# Patient Record
Sex: MALE | Race: BLACK OR AFRICAN AMERICAN | NOT HISPANIC OR LATINO | Employment: OTHER | URBAN - METROPOLITAN AREA
[De-identification: names, ages, dates, MRNs, and addresses within clinical notes are randomized per-mention and may not be internally consistent; named-entity substitution may affect disease eponyms.]

---

## 2017-01-05 ENCOUNTER — HISTORICAL (OUTPATIENT)
Dept: INFUSION THERAPY | Facility: HOSPITAL | Age: 67
End: 2017-01-05

## 2017-04-15 ENCOUNTER — HOSPITAL ENCOUNTER (OUTPATIENT)
Dept: ADMINISTRATIVE | Facility: HOSPITAL | Age: 67
End: 2017-04-16
Attending: INTERNAL MEDICINE | Admitting: INTERNAL MEDICINE

## 2017-05-09 ENCOUNTER — HOSPITAL ENCOUNTER (OUTPATIENT)
Dept: ADMINISTRATIVE | Facility: HOSPITAL | Age: 67
End: 2017-05-10
Attending: INTERNAL MEDICINE | Admitting: INTERNAL MEDICINE

## 2017-05-10 LAB
ABS NEUT (OLG): 3.55 X10(3)/MCL (ref 2.1–9.2)
BUN SERPL-MCNC: 10 MG/DL (ref 7–18)
BUN SERPL-MCNC: 8 MG/DL (ref 7–18)
CALCIUM SERPL-MCNC: 8.2 MG/DL (ref 8.5–10.1)
CALCIUM SERPL-MCNC: 8.6 MG/DL (ref 8.5–10.1)
CHLORIDE SERPL-SCNC: 107 MMOL/L (ref 98–107)
CHLORIDE SERPL-SCNC: 109 MMOL/L (ref 98–107)
CHOLEST SERPL-MCNC: 112 MG/DL (ref 0–200)
CHOLEST/HDLC SERPL: 2.7 {RATIO} (ref 0–5)
CO2 SERPL-SCNC: 24 MMOL/L (ref 21–32)
CO2 SERPL-SCNC: 25 MMOL/L (ref 21–32)
CREAT SERPL-MCNC: 0.87 MG/DL (ref 0.7–1.3)
CREAT SERPL-MCNC: 0.92 MG/DL (ref 0.7–1.3)
ERYTHROCYTE [DISTWIDTH] IN BLOOD BY AUTOMATED COUNT: 18 % (ref 11.5–17)
EST. AVERAGE GLUCOSE BLD GHB EST-MCNC: 94 MG/DL
GLUCOSE SERPL-MCNC: 160 MG/DL (ref 74–106)
GLUCOSE SERPL-MCNC: 171 MG/DL (ref 74–106)
HBA1C MFR BLD: 4.9 % (ref 4.2–6.3)
HCT VFR BLD AUTO: 29.3 % (ref 42–52)
HDLC SERPL-MCNC: 42 MG/DL (ref 35–60)
HGB BLD-MCNC: 8.8 GM/DL (ref 14–18)
LDLC SERPL CALC-MCNC: 59 MG/DL (ref 0–129)
LYMPHOCYTES # BLD AUTO: 0.4 X10(3)/MCL (ref 0.6–4.6)
LYMPHOCYTES NFR BLD AUTO: 10 %
MAGNESIUM SERPL-MCNC: 1.1 MG/DL (ref 1.8–2.4)
MAGNESIUM SERPL-MCNC: 2.4 MG/DL (ref 1.8–2.4)
MAGNESIUM SERPL-MCNC: 2.4 MG/DL (ref 1.8–2.4)
MCH RBC QN AUTO: 21.6 PG (ref 27–31)
MCHC RBC AUTO-ENTMCNC: 30 GM/DL (ref 33–36)
MCV RBC AUTO: 72 FL (ref 80–94)
MONOCYTES # BLD AUTO: 0 X10(3)/MCL (ref 0.1–1.3)
MONOCYTES NFR BLD AUTO: 1 %
NEUTROPHILS # BLD AUTO: 3.55 X10(3)/MCL (ref 2.1–9.2)
NEUTROPHILS NFR BLD AUTO: 89 %
PLATELET # BLD AUTO: 351 X10(3)/MCL (ref 130–400)
PMV BLD AUTO: 10.4 FL (ref 9.4–12.4)
POTASSIUM SERPL-SCNC: 3.7 MMOL/L (ref 3.5–5.1)
POTASSIUM SERPL-SCNC: 3.9 MMOL/L (ref 3.5–5.1)
PSA SERPL-MCNC: 1.73 NG/ML (ref 0–4)
RBC # BLD AUTO: 4.07 X10(6)/MCL (ref 4.7–6.1)
SODIUM SERPL-SCNC: 142 MMOL/L (ref 136–145)
SODIUM SERPL-SCNC: 142 MMOL/L (ref 136–145)
TRIGL SERPL-MCNC: 53 MG/DL (ref 30–150)
TSH SERPL-ACNC: 0.1 MIU/ML (ref 0.36–3.74)
VLDLC SERPL CALC-MCNC: 11 MG/DL
WBC # SPEC AUTO: 4 X10(3)/MCL (ref 4.5–11.5)

## 2017-06-13 ENCOUNTER — HISTORICAL (OUTPATIENT)
Dept: HEMATOLOGY/ONCOLOGY | Facility: CLINIC | Age: 67
End: 2017-06-13

## 2017-06-13 LAB
ABS NEUT (OLG): 1.29 X10(3)/MCL (ref 2.1–9.2)
ALBUMIN SERPL-MCNC: 4.1 GM/DL (ref 3.4–5)
ALBUMIN/GLOB SERPL: 2 {RATIO}
ALP SERPL-CCNC: 55 UNIT/L (ref 20–120)
ALT SERPL-CCNC: 16 UNIT/L
AST SERPL-CCNC: 29 UNIT/L
BASOPHILS # BLD AUTO: 0.01 X10(3)/MCL
BASOPHILS NFR BLD AUTO: 0 % (ref 0–1)
BILIRUB SERPL-MCNC: 0.4 MG/DL
BILIRUBIN DIRECT+TOT PNL SERPL-MCNC: <0.1 MG/DL
BILIRUBIN DIRECT+TOT PNL SERPL-MCNC: >0.3 MG/DL
BUN SERPL-MCNC: 9 MG/DL (ref 7–25)
CALCIUM SERPL-MCNC: 9.1 MG/DL (ref 8.4–10.3)
CHLORIDE SERPL-SCNC: 107 MMOL/L (ref 96–110)
CO2 SERPL-SCNC: 26 MMOL/L (ref 24–32)
CREAT SERPL-MCNC: 0.78 MG/DL (ref 0.7–1.4)
EOSINOPHIL # BLD AUTO: 0.5 10*3/UL
EOSINOPHIL NFR BLD AUTO: 14 % (ref 0–5)
ERYTHROCYTE [DISTWIDTH] IN BLOOD BY AUTOMATED COUNT: 25.6 % (ref 11.5–14.5)
GLOBULIN SER-MCNC: 2.5 GM/ML (ref 2.3–3.5)
GLUCOSE SERPL-MCNC: 88 MG/DL (ref 65–99)
HCT VFR BLD AUTO: 33.9 % (ref 40–51)
HGB BLD-MCNC: 10.5 GM/DL (ref 13.5–17.5)
LYMPHOCYTES # BLD AUTO: 1.39 X10(3)/MCL
LYMPHOCYTES NFR BLD AUTO: 40 % (ref 15–40)
MCH RBC QN AUTO: 22.7 PG (ref 26–34)
MCHC RBC AUTO-ENTMCNC: 31 GM/DL (ref 31–37)
MCV RBC AUTO: 73.4 FL (ref 80–100)
MONOCYTES # BLD AUTO: 0.32 X10(3)/MCL
MONOCYTES NFR BLD AUTO: 9 % (ref 4–12)
NEUTROPHILS # BLD AUTO: 1.29 X10(3)/MCL
NEUTROPHILS NFR BLD AUTO: 37 X10(3)/MCL
PLATELET # BLD AUTO: 252 X10(3)/MCL (ref 130–400)
PMV BLD AUTO: 9.8 FL (ref 7.4–10.4)
POTASSIUM SERPL-SCNC: 3.1 MMOL/L (ref 3.6–5.2)
PROT SERPL-MCNC: 6.6 GM/DL (ref 6–8)
RBC # BLD AUTO: 4.62 X10(6)/MCL (ref 4.5–5.9)
SODIUM SERPL-SCNC: 139 MMOL/L (ref 135–146)
WBC # SPEC AUTO: 3.5 X10(3)/MCL (ref 4.5–11)

## 2017-09-13 ENCOUNTER — HISTORICAL (OUTPATIENT)
Dept: INTERNAL MEDICINE | Facility: CLINIC | Age: 67
End: 2017-09-13

## 2017-09-13 LAB
ABS NEUT (OLG): 1.8 X10(3)/MCL (ref 2.1–9.2)
ALBUMIN SERPL-MCNC: 3.7 GM/DL (ref 3.4–5)
ALBUMIN/GLOB SERPL: 1 RATIO (ref 1–2)
ALP SERPL-CCNC: 57 UNIT/L (ref 45–117)
ALT SERPL-CCNC: 19 UNIT/L (ref 12–78)
AST SERPL-CCNC: 25 UNIT/L (ref 15–37)
BASOPHILS # BLD AUTO: 0.02 X10(3)/MCL
BASOPHILS NFR BLD AUTO: 0 % (ref 0–1)
BILIRUB SERPL-MCNC: 0.5 MG/DL (ref 0.2–1)
BILIRUBIN DIRECT+TOT PNL SERPL-MCNC: 0.1 MG/DL
BILIRUBIN DIRECT+TOT PNL SERPL-MCNC: 0.4 MG/DL
BUN SERPL-MCNC: 9 MG/DL (ref 7–18)
CALCIUM SERPL-MCNC: 8.9 MG/DL (ref 8.5–10.1)
CHLORIDE SERPL-SCNC: 109 MMOL/L (ref 98–107)
CO2 SERPL-SCNC: 28 MMOL/L (ref 21–32)
CREAT SERPL-MCNC: 0.9 MG/DL (ref 0.6–1.3)
EOSINOPHIL # BLD AUTO: 0.38 X10(3)/MCL
EOSINOPHIL NFR BLD AUTO: 10 % (ref 0–5)
ERYTHROCYTE [DISTWIDTH] IN BLOOD BY AUTOMATED COUNT: 17.6 % (ref 11.5–14.5)
GLOBULIN SER-MCNC: 3.1 GM/ML (ref 2.3–3.5)
GLUCOSE SERPL-MCNC: 96 MG/DL (ref 74–106)
HCT VFR BLD AUTO: 34.5 % (ref 40–51)
HGB BLD-MCNC: 11.6 GM/DL (ref 13.5–17.5)
LYMPHOCYTES # BLD AUTO: 1.25 X10(3)/MCL
LYMPHOCYTES NFR BLD AUTO: 33 % (ref 15–40)
MCH RBC QN AUTO: 28.7 PG (ref 26–34)
MCHC RBC AUTO-ENTMCNC: 33.6 GM/DL (ref 31–37)
MCV RBC AUTO: 85.4 FL (ref 80–100)
MONOCYTES # BLD AUTO: 0.38 X10(3)/MCL
MONOCYTES NFR BLD AUTO: 10 % (ref 4–12)
NEUTROPHILS # BLD AUTO: 1.8 X10(3)/MCL
NEUTROPHILS NFR BLD AUTO: 47 X10(3)/MCL
PLATELET # BLD AUTO: 327 X10(3)/MCL (ref 130–400)
PMV BLD AUTO: 9.8 FL (ref 7.4–10.4)
POTASSIUM SERPL-SCNC: 3.4 MMOL/L (ref 3.5–5.1)
PROT SERPL-MCNC: 6.8 GM/DL (ref 6.4–8.2)
RBC # BLD AUTO: 4.04 X10(6)/MCL (ref 4.5–5.9)
SODIUM SERPL-SCNC: 145 MMOL/L (ref 136–145)
WBC # SPEC AUTO: 3.8 X10(3)/MCL (ref 4.5–11)

## 2017-09-19 ENCOUNTER — HISTORICAL (OUTPATIENT)
Dept: LAB | Facility: HOSPITAL | Age: 67
End: 2017-09-19

## 2017-09-19 LAB
ABS NEUT (OLG): 3.19 X10(3)/MCL (ref 2.1–9.2)
BUN SERPL-MCNC: 8 MG/DL (ref 7–18)
CALCIUM SERPL-MCNC: 8.9 MG/DL (ref 8.5–10.1)
CHLORIDE SERPL-SCNC: 105 MMOL/L (ref 98–107)
CO2 SERPL-SCNC: 28 MMOL/L (ref 21–32)
CREAT SERPL-MCNC: 0.77 MG/DL (ref 0.7–1.3)
ERYTHROCYTE [DISTWIDTH] IN BLOOD BY AUTOMATED COUNT: 16.8 % (ref 11.5–17)
GLUCOSE SERPL-MCNC: 90 MG/DL (ref 74–106)
HCT VFR BLD AUTO: 37 % (ref 42–52)
HGB BLD-MCNC: 12.5 GM/DL (ref 14–18)
INR PPP: 1
MCH RBC QN AUTO: 28.9 PG (ref 27–31)
MCHC RBC AUTO-ENTMCNC: 33.8 GM/DL (ref 33–36)
MCV RBC AUTO: 85.6 FL (ref 80–94)
PLATELET # BLD AUTO: 287 X10(3)/MCL (ref 130–400)
PMV BLD AUTO: 9.6 FL (ref 7.4–10.4)
POTASSIUM SERPL-SCNC: 3.6 MMOL/L (ref 3.5–5.1)
PROTHROMBIN TIME: 11.2 SECOND(S) (ref 9.8–12)
RBC # BLD AUTO: 4.32 X10(6)/MCL (ref 4.7–6.1)
SODIUM SERPL-SCNC: 140 MMOL/L (ref 136–145)
WBC # SPEC AUTO: 5.1 X10(3)/MCL (ref 4.5–11.5)

## 2017-10-09 ENCOUNTER — HISTORICAL (OUTPATIENT)
Dept: ADMINISTRATIVE | Facility: HOSPITAL | Age: 67
End: 2017-10-09

## 2017-10-09 LAB
ABS NEUT (OLG): 9.52 X10(3)/MCL
ALBUMIN SERPL-MCNC: 3.6 GM/DL (ref 3.4–5)
ALBUMIN/GLOB SERPL: 1 RATIO (ref 1–2)
ALP SERPL-CCNC: 49 UNIT/L (ref 45–117)
ALT SERPL-CCNC: 24 UNIT/L (ref 12–78)
ANISOCYTOSIS BLD QL SMEAR: NORMAL
AST SERPL-CCNC: 13 UNIT/L (ref 15–37)
BASOPHILS # BLD AUTO: 0.01 X10(3)/MCL
BASOPHILS NFR BLD AUTO: 0 % (ref 0–1)
BILIRUB SERPL-MCNC: 0.4 MG/DL (ref 0.2–1)
BILIRUBIN DIRECT+TOT PNL SERPL-MCNC: 0.2 MG/DL
BILIRUBIN DIRECT+TOT PNL SERPL-MCNC: 0.2 MG/DL
BUN SERPL-MCNC: 24 MG/DL (ref 7–18)
CALCIUM SERPL-MCNC: 9.7 MG/DL (ref 8.5–10.1)
CHLORIDE SERPL-SCNC: 104 MMOL/L (ref 98–107)
CO2 SERPL-SCNC: 26 MMOL/L (ref 21–32)
CREAT SERPL-MCNC: 1.4 MG/DL (ref 0.6–1.3)
EOSINOPHIL # BLD AUTO: 0.1 X10(3)/MCL
EOSINOPHIL NFR BLD AUTO: 1 % (ref 0–5)
ERYTHROCYTE [DISTWIDTH] IN BLOOD BY AUTOMATED COUNT: 16.2 % (ref 11.5–14.5)
GLOBULIN SER-MCNC: 3.4 GM/ML (ref 2.3–3.5)
GLUCOSE SERPL-MCNC: 92 MG/DL (ref 74–106)
HCT VFR BLD AUTO: 34.9 % (ref 40–51)
HGB BLD-MCNC: 11.6 GM/DL (ref 13.5–17.5)
IMM GRANULOCYTES # BLD AUTO: 0.08 10*3/UL
IMM GRANULOCYTES NFR BLD AUTO: 1 %
LYMPHOCYTES # BLD AUTO: 2.94 X10(3)/MCL
LYMPHOCYTES NFR BLD AUTO: 21 % (ref 15–40)
MCH RBC QN AUTO: 28.2 PG (ref 26–34)
MCHC RBC AUTO-ENTMCNC: 33.2 GM/DL (ref 31–37)
MCV RBC AUTO: 84.9 FL (ref 80–100)
MONOCYTES # BLD AUTO: 1.41 X10(3)/MCL
MONOCYTES NFR BLD AUTO: 10 % (ref 4–12)
NEUTROPHILS # BLD AUTO: 9.52 X10(3)/MCL
NEUTROPHILS NFR BLD AUTO: 68 %
PLATELET # BLD AUTO: 888 X10(3)/MCL (ref 130–400)
PLATELET # BLD EST: NORMAL 10*3/UL
PMV BLD AUTO: 9.7 FL (ref 7.4–10.4)
POLYCHROMASIA BLD QL SMEAR: NORMAL
POTASSIUM SERPL-SCNC: 3.6 MMOL/L (ref 3.5–5.1)
PROT SERPL-MCNC: 7 GM/DL (ref 6.4–8.2)
RBC # BLD AUTO: 4.11 X10(6)/MCL (ref 4.5–5.9)
RBC MORPH BLD: NORMAL
SODIUM SERPL-SCNC: 139 MMOL/L (ref 136–145)
WBC # SPEC AUTO: 14.1 X10(3)/MCL (ref 4.5–11)

## 2018-01-22 ENCOUNTER — HISTORICAL (OUTPATIENT)
Dept: ADMINISTRATIVE | Facility: HOSPITAL | Age: 68
End: 2018-01-22

## 2018-01-22 LAB
ABS NEUT (OLG): 1.69 X10(3)/MCL (ref 2.1–9.2)
ALBUMIN SERPL-MCNC: 3.9 GM/DL (ref 3.4–5)
ALBUMIN/GLOB SERPL: 1 RATIO (ref 1–2)
ALP SERPL-CCNC: 63 UNIT/L (ref 45–117)
ALT SERPL-CCNC: 19 UNIT/L (ref 12–78)
AST SERPL-CCNC: 29 UNIT/L (ref 15–37)
BASOPHILS # BLD AUTO: 0.02 X10(3)/MCL
BASOPHILS NFR BLD AUTO: 1 % (ref 0–1)
BILIRUB SERPL-MCNC: 0.5 MG/DL (ref 0.2–1)
BILIRUBIN DIRECT+TOT PNL SERPL-MCNC: 0.1 MG/DL
BILIRUBIN DIRECT+TOT PNL SERPL-MCNC: 0.4 MG/DL
BUN SERPL-MCNC: 5 MG/DL (ref 7–18)
CALCIUM SERPL-MCNC: 8.8 MG/DL (ref 8.5–10.1)
CHLORIDE SERPL-SCNC: 110 MMOL/L (ref 98–107)
CO2 SERPL-SCNC: 26 MMOL/L (ref 21–32)
CREAT SERPL-MCNC: 0.8 MG/DL (ref 0.6–1.3)
EOSINOPHIL # BLD AUTO: 0.36 X10(3)/MCL
EOSINOPHIL NFR BLD AUTO: 11 % (ref 0–5)
ERYTHROCYTE [DISTWIDTH] IN BLOOD BY AUTOMATED COUNT: 16.9 % (ref 11.5–14.5)
GLOBULIN SER-MCNC: 3 GM/ML (ref 2.3–3.5)
GLUCOSE SERPL-MCNC: 129 MG/DL (ref 74–106)
HCT VFR BLD AUTO: 31.3 % (ref 40–51)
HGB BLD-MCNC: 10.3 GM/DL (ref 13.5–17.5)
LDH SERPL-CCNC: 210 UNIT/L (ref 87–241)
LYMPHOCYTES # BLD AUTO: 0.92 X10(3)/MCL
LYMPHOCYTES NFR BLD AUTO: 29 % (ref 15–40)
MCH RBC QN AUTO: 27.7 PG (ref 26–34)
MCHC RBC AUTO-ENTMCNC: 32.9 GM/DL (ref 31–37)
MCV RBC AUTO: 84.1 FL (ref 80–100)
MONOCYTES # BLD AUTO: 0.23 X10(3)/MCL
MONOCYTES NFR BLD AUTO: 7 % (ref 4–12)
NEUTROPHILS # BLD AUTO: 1.69 X10(3)/MCL
NEUTROPHILS NFR BLD AUTO: 52 X10(3)/MCL
PLATELET # BLD AUTO: 306 X10(3)/MCL (ref 130–400)
PMV BLD AUTO: 10.4 FL (ref 7.4–10.4)
POTASSIUM SERPL-SCNC: 3 MMOL/L (ref 3.5–5.1)
PROT SERPL-MCNC: 6.9 GM/DL (ref 6.4–8.2)
RBC # BLD AUTO: 3.72 X10(6)/MCL (ref 4.5–5.9)
SODIUM SERPL-SCNC: 144 MMOL/L (ref 136–145)
WBC # SPEC AUTO: 3.2 X10(3)/MCL (ref 4.5–11)

## 2018-02-02 ENCOUNTER — HISTORICAL (OUTPATIENT)
Dept: ADMINISTRATIVE | Facility: HOSPITAL | Age: 68
End: 2018-02-02

## 2018-02-02 LAB — POTASSIUM SERPL-SCNC: 3.9 MMOL/L (ref 3.5–5.1)

## 2018-04-30 ENCOUNTER — HISTORICAL (OUTPATIENT)
Dept: ADMINISTRATIVE | Facility: HOSPITAL | Age: 68
End: 2018-04-30

## 2018-04-30 LAB
ABS NEUT (OLG): 1.79 X10(3)/MCL (ref 2.1–9.2)
ALBUMIN SERPL-MCNC: 3.8 GM/DL (ref 3.4–5)
ALBUMIN/GLOB SERPL: 1 RATIO (ref 1–2)
ALP SERPL-CCNC: 64 UNIT/L (ref 45–117)
ALT SERPL-CCNC: 24 UNIT/L (ref 12–78)
AST SERPL-CCNC: 26 UNIT/L (ref 15–37)
BASOPHILS # BLD AUTO: 0.04 X10(3)/MCL
BASOPHILS NFR BLD AUTO: 1 %
BILIRUB SERPL-MCNC: 0.4 MG/DL (ref 0.2–1)
BILIRUBIN DIRECT+TOT PNL SERPL-MCNC: 0.2 MG/DL
BILIRUBIN DIRECT+TOT PNL SERPL-MCNC: 0.2 MG/DL
BUN SERPL-MCNC: 9 MG/DL (ref 7–18)
CALCIUM SERPL-MCNC: 9.2 MG/DL (ref 8.5–10.1)
CHLORIDE SERPL-SCNC: 105 MMOL/L (ref 98–107)
CO2 SERPL-SCNC: 28 MMOL/L (ref 21–32)
CREAT SERPL-MCNC: 1 MG/DL (ref 0.6–1.3)
EOSINOPHIL # BLD AUTO: 0.56 X10(3)/MCL
EOSINOPHIL NFR BLD AUTO: 14 %
ERYTHROCYTE [DISTWIDTH] IN BLOOD BY AUTOMATED COUNT: 18.2 % (ref 11.5–14.5)
GLOBULIN SER-MCNC: 3.7 GM/ML (ref 2.3–3.5)
GLUCOSE SERPL-MCNC: 124 MG/DL (ref 74–106)
HCT VFR BLD AUTO: 33.5 % (ref 40–51)
HGB BLD-MCNC: 10.4 GM/DL (ref 13.5–17.5)
LYMPHOCYTES # BLD AUTO: 1.23 X10(3)/MCL
LYMPHOCYTES NFR BLD AUTO: 30 % (ref 13–40)
MCH RBC QN AUTO: 23.7 PG (ref 26–34)
MCHC RBC AUTO-ENTMCNC: 31 GM/DL (ref 31–37)
MCV RBC AUTO: 76.5 FL (ref 80–100)
MONOCYTES # BLD AUTO: 0.41 X10(3)/MCL
MONOCYTES NFR BLD AUTO: 10 % (ref 4–12)
NEUTROPHILS # BLD AUTO: 1.79 X10(3)/MCL
NEUTROPHILS NFR BLD AUTO: 44 X10(3)/MCL
PLATELET # BLD AUTO: 386 X10(3)/MCL (ref 130–400)
PMV BLD AUTO: 9.7 FL (ref 7.4–10.4)
POTASSIUM SERPL-SCNC: 3.1 MMOL/L (ref 3.5–5.1)
PROT SERPL-MCNC: 7.5 GM/DL (ref 6.4–8.2)
RBC # BLD AUTO: 4.38 X10(6)/MCL (ref 4.5–5.9)
SODIUM SERPL-SCNC: 141 MMOL/L (ref 136–145)
WBC # SPEC AUTO: 4 X10(3)/MCL (ref 4.5–11)

## 2018-06-20 ENCOUNTER — HISTORICAL (OUTPATIENT)
Dept: RADIOLOGY | Facility: HOSPITAL | Age: 68
End: 2018-06-20

## 2018-08-20 ENCOUNTER — HISTORICAL (OUTPATIENT)
Dept: INTERNAL MEDICINE | Facility: CLINIC | Age: 68
End: 2018-08-20

## 2018-08-20 LAB
ABS NEUT (OLG): 2.27 X10(3)/MCL (ref 2.1–9.2)
ALBUMIN SERPL-MCNC: 3.6 GM/DL (ref 3.4–5)
ALBUMIN/GLOB SERPL: 1 RATIO (ref 1–2)
ALP SERPL-CCNC: 76 UNIT/L (ref 45–117)
ALT SERPL-CCNC: 17 UNIT/L (ref 12–78)
AST SERPL-CCNC: 17 UNIT/L (ref 15–37)
BASOPHILS # BLD AUTO: 0.02 X10(3)/MCL
BASOPHILS NFR BLD AUTO: 0 %
BILIRUB SERPL-MCNC: 0.4 MG/DL (ref 0.2–1)
BILIRUBIN DIRECT+TOT PNL SERPL-MCNC: 0.2 MG/DL
BILIRUBIN DIRECT+TOT PNL SERPL-MCNC: 0.2 MG/DL
BUN SERPL-MCNC: 7 MG/DL (ref 7–18)
CALCIUM SERPL-MCNC: 8.6 MG/DL (ref 8.5–10.1)
CHLORIDE SERPL-SCNC: 108 MMOL/L (ref 98–107)
CHOLEST SERPL-MCNC: 74 MG/DL
CHOLEST/HDLC SERPL: 1.3 {RATIO} (ref 0–5)
CO2 SERPL-SCNC: 27 MMOL/L (ref 21–32)
CREAT SERPL-MCNC: 1 MG/DL (ref 0.6–1.3)
EOSINOPHIL # BLD AUTO: 0.24 X10(3)/MCL
EOSINOPHIL NFR BLD AUTO: 5 %
ERYTHROCYTE [DISTWIDTH] IN BLOOD BY AUTOMATED COUNT: 18.6 % (ref 11.5–14.5)
GLOBULIN SER-MCNC: 3.3 GM/ML (ref 2.3–3.5)
GLUCOSE SERPL-MCNC: 107 MG/DL (ref 74–106)
HCT VFR BLD AUTO: 33.9 % (ref 40–51)
HDLC SERPL-MCNC: 55 MG/DL
HGB BLD-MCNC: 10.7 GM/DL (ref 13.5–17.5)
IMM GRANULOCYTES # BLD AUTO: 0.01 10*3/UL
IMM GRANULOCYTES NFR BLD AUTO: 0 %
LDLC SERPL CALC-MCNC: 7 MG/DL (ref 0–130)
LYMPHOCYTES # BLD AUTO: 1.91 X10(3)/MCL
LYMPHOCYTES NFR BLD AUTO: 40 % (ref 13–40)
MCH RBC QN AUTO: 24.9 PG (ref 26–34)
MCHC RBC AUTO-ENTMCNC: 31.6 GM/DL (ref 31–37)
MCV RBC AUTO: 79 FL (ref 80–100)
MONOCYTES # BLD AUTO: 0.35 X10(3)/MCL
MONOCYTES NFR BLD AUTO: 7 % (ref 4–12)
NEUTROPHILS # BLD AUTO: 2.27 X10(3)/MCL
NEUTROPHILS NFR BLD AUTO: 47 X10(3)/MCL
PLATELET # BLD AUTO: 321 X10(3)/MCL (ref 130–400)
PMV BLD AUTO: 10 FL (ref 7.4–10.4)
POTASSIUM SERPL-SCNC: 3.2 MMOL/L (ref 3.5–5.1)
PROT SERPL-MCNC: 6.9 GM/DL (ref 6.4–8.2)
RBC # BLD AUTO: 4.29 X10(6)/MCL (ref 4.5–5.9)
SODIUM SERPL-SCNC: 145 MMOL/L (ref 136–145)
TRIGL SERPL-MCNC: 62 MG/DL
VLDLC SERPL CALC-MCNC: 12 MG/DL
WBC # SPEC AUTO: 4.8 X10(3)/MCL (ref 4.5–11)

## 2018-09-04 ENCOUNTER — HISTORICAL (OUTPATIENT)
Dept: INTERNAL MEDICINE | Facility: CLINIC | Age: 68
End: 2018-09-04

## 2018-09-06 LAB — FINAL CULTURE: NORMAL

## 2018-11-20 ENCOUNTER — HISTORICAL (OUTPATIENT)
Dept: ADMINISTRATIVE | Facility: HOSPITAL | Age: 68
End: 2018-11-20

## 2018-11-20 LAB
ABS NEUT (OLG): 2.54 X10(3)/MCL (ref 2.1–9.2)
ALBUMIN SERPL-MCNC: 3.6 GM/DL (ref 3.4–5)
ALBUMIN/GLOB SERPL: 1 RATIO (ref 1–2)
ALP SERPL-CCNC: 70 UNIT/L (ref 45–117)
ALT SERPL-CCNC: 24 UNIT/L (ref 12–78)
AST SERPL-CCNC: 20 UNIT/L (ref 15–37)
BASOPHILS # BLD AUTO: 0.02 X10(3)/MCL
BASOPHILS NFR BLD AUTO: 0 %
BILIRUB SERPL-MCNC: 0.3 MG/DL (ref 0.2–1)
BILIRUBIN DIRECT+TOT PNL SERPL-MCNC: 0.1 MG/DL
BILIRUBIN DIRECT+TOT PNL SERPL-MCNC: 0.2 MG/DL
BUN SERPL-MCNC: 7 MG/DL (ref 7–18)
CALCIUM SERPL-MCNC: 9.3 MG/DL (ref 8.5–10.1)
CHLORIDE SERPL-SCNC: 107 MMOL/L (ref 98–107)
CO2 SERPL-SCNC: 28 MMOL/L (ref 21–32)
CREAT SERPL-MCNC: 0.9 MG/DL (ref 0.6–1.3)
EOSINOPHIL # BLD AUTO: 0.29 10*3/UL
EOSINOPHIL NFR BLD AUTO: 6 %
ERYTHROCYTE [DISTWIDTH] IN BLOOD BY AUTOMATED COUNT: 16 % (ref 11.5–14.5)
GLOBULIN SER-MCNC: 3.4 GM/ML (ref 2.3–3.5)
GLUCOSE SERPL-MCNC: 109 MG/DL (ref 74–106)
HCT VFR BLD AUTO: 40.6 % (ref 40–51)
HGB BLD-MCNC: 13.4 GM/DL (ref 13.5–17.5)
IMM GRANULOCYTES # BLD AUTO: 0.02 10*3/UL
IMM GRANULOCYTES NFR BLD AUTO: 0 %
LYMPHOCYTES # BLD AUTO: 1.12 X10(3)/MCL
LYMPHOCYTES NFR BLD AUTO: 25 % (ref 13–40)
MCH RBC QN AUTO: 28.7 PG (ref 26–34)
MCHC RBC AUTO-ENTMCNC: 33 GM/DL (ref 31–37)
MCV RBC AUTO: 86.9 FL (ref 80–100)
MONOCYTES # BLD AUTO: 0.54 X10(3)/MCL
MONOCYTES NFR BLD AUTO: 12 % (ref 4–12)
NEUTROPHILS # BLD AUTO: 2.54 X10(3)/MCL
NEUTROPHILS NFR BLD AUTO: 56 X10(3)/MCL
PLATELET # BLD AUTO: 245 X10(3)/MCL (ref 130–400)
PMV BLD AUTO: 10.2 FL (ref 7.4–10.4)
POTASSIUM SERPL-SCNC: 3.8 MMOL/L (ref 3.5–5.1)
PROT SERPL-MCNC: 7 GM/DL (ref 6.4–8.2)
RBC # BLD AUTO: 4.67 X10(6)/MCL (ref 4.5–5.9)
SODIUM SERPL-SCNC: 140 MMOL/L (ref 136–145)
WBC # SPEC AUTO: 4.5 X10(3)/MCL (ref 4.5–11)

## 2019-02-20 ENCOUNTER — HISTORICAL (OUTPATIENT)
Dept: ADMINISTRATIVE | Facility: HOSPITAL | Age: 69
End: 2019-02-20

## 2019-02-20 LAB
ABS NEUT (OLG): 2.34 X10(3)/MCL (ref 2.1–9.2)
ALBUMIN SERPL-MCNC: 3.4 GM/DL (ref 3.4–5)
ALBUMIN/GLOB SERPL: 0.8 RATIO (ref 1.1–2)
ALP SERPL-CCNC: 78 UNIT/L (ref 45–117)
ALT SERPL-CCNC: 22 UNIT/L (ref 12–78)
AST SERPL-CCNC: 20 UNIT/L (ref 15–37)
BASOPHILS # BLD AUTO: 0.04 X10(3)/MCL
BASOPHILS NFR BLD AUTO: 1 %
BILIRUB SERPL-MCNC: 0.3 MG/DL (ref 0.2–1)
BILIRUBIN DIRECT+TOT PNL SERPL-MCNC: <0.1 MG/DL
BILIRUBIN DIRECT+TOT PNL SERPL-MCNC: ABNORMAL MG/DL
BUN SERPL-MCNC: 9 MG/DL (ref 7–18)
CALCIUM SERPL-MCNC: 9.3 MG/DL (ref 8.5–10.1)
CHLORIDE SERPL-SCNC: 107 MMOL/L (ref 98–107)
CO2 SERPL-SCNC: 28 MMOL/L (ref 21–32)
CREAT SERPL-MCNC: 0.9 MG/DL (ref 0.6–1.3)
EOSINOPHIL # BLD AUTO: 0.92 X10(3)/MCL
EOSINOPHIL NFR BLD AUTO: 17 %
ERYTHROCYTE [DISTWIDTH] IN BLOOD BY AUTOMATED COUNT: 14.9 % (ref 11.5–14.5)
GLOBULIN SER-MCNC: 4 GM/ML (ref 2.3–3.5)
GLUCOSE SERPL-MCNC: 105 MG/DL (ref 74–106)
HCT VFR BLD AUTO: 38.6 % (ref 40–51)
HGB BLD-MCNC: 12.6 GM/DL (ref 13.5–17.5)
IMM GRANULOCYTES # BLD AUTO: 0.01 10*3/UL
IMM GRANULOCYTES NFR BLD AUTO: 0 %
LYMPHOCYTES # BLD AUTO: 1.76 X10(3)/MCL
LYMPHOCYTES NFR BLD AUTO: 32 % (ref 13–40)
MCH RBC QN AUTO: 28.6 PG (ref 26–34)
MCHC RBC AUTO-ENTMCNC: 32.6 GM/DL (ref 31–37)
MCV RBC AUTO: 87.5 FL (ref 80–100)
MONOCYTES # BLD AUTO: 0.37 X10(3)/MCL
MONOCYTES NFR BLD AUTO: 7 % (ref 4–12)
NEUTROPHILS # BLD AUTO: 2.34 X10(3)/MCL
NEUTROPHILS NFR BLD AUTO: 43 X10(3)/MCL
PLATELET # BLD AUTO: 448 X10(3)/MCL (ref 130–400)
PMV BLD AUTO: 10.2 FL (ref 7.4–10.4)
POTASSIUM SERPL-SCNC: 3.6 MMOL/L (ref 3.5–5.1)
PROT SERPL-MCNC: 7.4 GM/DL (ref 6.4–8.2)
RBC # BLD AUTO: 4.41 X10(6)/MCL (ref 4.5–5.9)
SODIUM SERPL-SCNC: 143 MMOL/L (ref 136–145)
WBC # SPEC AUTO: 5.4 X10(3)/MCL (ref 4.5–11)

## 2019-03-26 ENCOUNTER — HISTORICAL (OUTPATIENT)
Dept: ADMINISTRATIVE | Facility: HOSPITAL | Age: 69
End: 2019-03-26

## 2019-05-10 ENCOUNTER — HISTORICAL (OUTPATIENT)
Dept: ADMINISTRATIVE | Facility: HOSPITAL | Age: 69
End: 2019-05-10

## 2019-05-10 LAB
ABS NEUT (OLG): 2.26 X10(3)/MCL (ref 2.1–9.2)
ALBUMIN SERPL-MCNC: 3.6 GM/DL (ref 3.4–5)
ALBUMIN/GLOB SERPL: 1.1 RATIO (ref 1.1–2)
ALP SERPL-CCNC: 74 UNIT/L (ref 45–117)
ALT SERPL-CCNC: 17 UNIT/L (ref 12–78)
AST SERPL-CCNC: 20 UNIT/L (ref 15–37)
BASOPHILS # BLD AUTO: 0.03 X10(3)/MCL
BASOPHILS NFR BLD AUTO: 1 %
BILIRUB SERPL-MCNC: 0.3 MG/DL (ref 0.2–1)
BILIRUBIN DIRECT+TOT PNL SERPL-MCNC: 0.1 MG/DL
BILIRUBIN DIRECT+TOT PNL SERPL-MCNC: 0.2 MG/DL
BUN SERPL-MCNC: 5 MG/DL (ref 7–18)
CALCIUM SERPL-MCNC: 9.1 MG/DL (ref 8.5–10.1)
CHLORIDE SERPL-SCNC: 112 MMOL/L (ref 98–107)
CO2 SERPL-SCNC: 26 MMOL/L (ref 21–32)
CREAT SERPL-MCNC: 0.8 MG/DL (ref 0.6–1.3)
EOSINOPHIL # BLD AUTO: 0.43 X10(3)/MCL
EOSINOPHIL NFR BLD AUTO: 9 %
ERYTHROCYTE [DISTWIDTH] IN BLOOD BY AUTOMATED COUNT: 16.4 % (ref 11.5–14.5)
GLOBULIN SER-MCNC: 3.4 GM/ML (ref 2.3–3.5)
GLUCOSE SERPL-MCNC: 85 MG/DL (ref 74–106)
HCT VFR BLD AUTO: 39.4 % (ref 40–51)
HGB BLD-MCNC: 12.7 GM/DL (ref 13.5–17.5)
IMM GRANULOCYTES # BLD AUTO: 0.01 10*3/UL
IMM GRANULOCYTES NFR BLD AUTO: 0 %
LYMPHOCYTES # BLD AUTO: 1.65 X10(3)/MCL
LYMPHOCYTES NFR BLD AUTO: 34 % (ref 13–40)
MCH RBC QN AUTO: 25.5 PG (ref 26–34)
MCHC RBC AUTO-ENTMCNC: 32.2 GM/DL (ref 31–37)
MCV RBC AUTO: 79 FL (ref 80–100)
MONOCYTES # BLD AUTO: 0.42 X10(3)/MCL
MONOCYTES NFR BLD AUTO: 9 % (ref 4–12)
NEUTROPHILS # BLD AUTO: 2.26 X10(3)/MCL
NEUTROPHILS NFR BLD AUTO: 47 X10(3)/MCL
PLATELET # BLD AUTO: 285 X10(3)/MCL (ref 130–400)
PMV BLD AUTO: 10.8 FL (ref 7.4–10.4)
POTASSIUM SERPL-SCNC: 3.5 MMOL/L (ref 3.5–5.1)
PROT SERPL-MCNC: 7 GM/DL (ref 6.4–8.2)
RBC # BLD AUTO: 4.99 X10(6)/MCL (ref 4.5–5.9)
SODIUM SERPL-SCNC: 144 MMOL/L (ref 136–145)
WBC # SPEC AUTO: 4.8 X10(3)/MCL (ref 4.5–11)

## 2019-06-11 ENCOUNTER — HISTORICAL (OUTPATIENT)
Dept: INTERNAL MEDICINE | Facility: CLINIC | Age: 69
End: 2019-06-11

## 2019-06-11 LAB
ABS NEUT (OLG): 3.68 X10(3)/MCL (ref 2.1–9.2)
ALBUMIN SERPL-MCNC: 3.5 GM/DL (ref 3.4–5)
ALBUMIN/GLOB SERPL: 1 RATIO (ref 1.1–2)
ALP SERPL-CCNC: 77 UNIT/L (ref 45–117)
ALT SERPL-CCNC: 15 UNIT/L (ref 12–78)
AST SERPL-CCNC: 12 UNIT/L (ref 15–37)
BASOPHILS # BLD AUTO: 0.03 X10(3)/MCL
BASOPHILS NFR BLD AUTO: 0 %
BILIRUB SERPL-MCNC: 0.6 MG/DL (ref 0.2–1)
BILIRUBIN DIRECT+TOT PNL SERPL-MCNC: 0.3 MG/DL
BILIRUBIN DIRECT+TOT PNL SERPL-MCNC: 0.3 MG/DL
BUN SERPL-MCNC: 9 MG/DL (ref 7–18)
CALCIUM SERPL-MCNC: 9.5 MG/DL (ref 8.5–10.1)
CHLORIDE SERPL-SCNC: 114 MMOL/L (ref 98–107)
CHOLEST SERPL-MCNC: <50 MG/DL
CHOLEST/HDLC SERPL: ABNORMAL {RATIO} (ref 0–5)
CO2 SERPL-SCNC: 23 MMOL/L (ref 21–32)
CREAT SERPL-MCNC: 0.8 MG/DL (ref 0.6–1.3)
EOSINOPHIL # BLD AUTO: 0.45 10*3/UL
EOSINOPHIL NFR BLD AUTO: 7 %
ERYTHROCYTE [DISTWIDTH] IN BLOOD BY AUTOMATED COUNT: 18.1 % (ref 11.5–14.5)
GLOBULIN SER-MCNC: 3.4 GM/ML (ref 2.3–3.5)
GLUCOSE SERPL-MCNC: 93 MG/DL (ref 74–106)
HCT VFR BLD AUTO: 37 % (ref 40–51)
HDLC SERPL-MCNC: 33 MG/DL
HGB BLD-MCNC: 11.8 GM/DL (ref 13.5–17.5)
IMM GRANULOCYTES # BLD AUTO: 0.02 10*3/UL
IMM GRANULOCYTES NFR BLD AUTO: 0 %
LDLC SERPL CALC-MCNC: <4 MG/DL (ref 0–130)
LYMPHOCYTES # BLD AUTO: 1.4 X10(3)/MCL
LYMPHOCYTES NFR BLD AUTO: 22 % (ref 13–40)
MCH RBC QN AUTO: 25.2 PG (ref 26–34)
MCHC RBC AUTO-ENTMCNC: 31.9 GM/DL (ref 31–37)
MCV RBC AUTO: 79.1 FL (ref 80–100)
MONOCYTES # BLD AUTO: 0.72 X10(3)/MCL
MONOCYTES NFR BLD AUTO: 11 % (ref 4–12)
NEUTROPHILS # BLD AUTO: 3.68 X10(3)/MCL
NEUTROPHILS NFR BLD AUTO: 58 X10(3)/MCL
PLATELET # BLD AUTO: 270 X10(3)/MCL (ref 130–400)
PMV BLD AUTO: 11.1 FL (ref 7.4–10.4)
POTASSIUM SERPL-SCNC: 3.8 MMOL/L (ref 3.5–5.1)
PROT SERPL-MCNC: 6.9 GM/DL (ref 6.4–8.2)
RBC # BLD AUTO: 4.68 X10(6)/MCL (ref 4.5–5.9)
SODIUM SERPL-SCNC: 145 MMOL/L (ref 136–145)
TRIGL SERPL-MCNC: 64 MG/DL
VLDLC SERPL CALC-MCNC: 13 MG/DL
WBC # SPEC AUTO: 6.3 X10(3)/MCL (ref 4.5–11)

## 2019-08-06 ENCOUNTER — HISTORICAL (OUTPATIENT)
Dept: HEMATOLOGY/ONCOLOGY | Facility: CLINIC | Age: 69
End: 2019-08-06

## 2019-08-06 LAB
ABS NEUT (OLG): 2.39 X10(3)/MCL (ref 2.1–9.2)
ALBUMIN SERPL-MCNC: 3.8 GM/DL (ref 3.4–5)
ALBUMIN/GLOB SERPL: 1.1 RATIO (ref 1.1–2)
ALP SERPL-CCNC: 85 UNIT/L (ref 45–117)
ALT SERPL-CCNC: 20 UNIT/L (ref 12–78)
AST SERPL-CCNC: 18 UNIT/L (ref 15–37)
BASOPHILS # BLD AUTO: 0.04 X10(3)/MCL
BASOPHILS NFR BLD AUTO: 1 %
BILIRUB SERPL-MCNC: 0.6 MG/DL (ref 0.2–1)
BILIRUBIN DIRECT+TOT PNL SERPL-MCNC: 0.2 MG/DL
BILIRUBIN DIRECT+TOT PNL SERPL-MCNC: 0.4 MG/DL
BUN SERPL-MCNC: 7 MG/DL (ref 7–18)
CALCIUM SERPL-MCNC: 9 MG/DL (ref 8.5–10.1)
CHLORIDE SERPL-SCNC: 114 MMOL/L (ref 98–107)
CO2 SERPL-SCNC: 27 MMOL/L (ref 21–32)
CREAT SERPL-MCNC: 0.8 MG/DL (ref 0.6–1.3)
EOSINOPHIL # BLD AUTO: 0.39 X10(3)/MCL
EOSINOPHIL NFR BLD AUTO: 8 %
ERYTHROCYTE [DISTWIDTH] IN BLOOD BY AUTOMATED COUNT: 17.2 % (ref 11.5–14.5)
FERRITIN SERPL-MCNC: 21 NG/ML (ref 26–388)
GLOBULIN SER-MCNC: 3.5 GM/ML (ref 2.3–3.5)
GLUCOSE SERPL-MCNC: 86 MG/DL (ref 74–106)
HCT VFR BLD AUTO: 41.3 % (ref 40–51)
HGB BLD-MCNC: 13.4 GM/DL (ref 13.5–17.5)
IMM GRANULOCYTES # BLD AUTO: 0.01 10*3/UL
IMM GRANULOCYTES NFR BLD AUTO: 0 %
IRON SATN MFR SERPL: 20.9 % (ref 15–50)
IRON SERPL-MCNC: 77 MCG/DL (ref 65–175)
LYMPHOCYTES # BLD AUTO: 1.77 X10(3)/MCL
LYMPHOCYTES NFR BLD AUTO: 35 % (ref 13–40)
MCH RBC QN AUTO: 27.9 PG (ref 26–34)
MCHC RBC AUTO-ENTMCNC: 32.4 GM/DL (ref 31–37)
MCV RBC AUTO: 86 FL (ref 80–100)
MONOCYTES # BLD AUTO: 0.41 X10(3)/MCL
MONOCYTES NFR BLD AUTO: 8 % (ref 0–24)
NEUTROPHILS # BLD AUTO: 2.39 X10(3)/MCL
NEUTROPHILS NFR BLD AUTO: 48 X10(3)/MCL
PLATELET # BLD AUTO: 253 X10(3)/MCL (ref 130–400)
PMV BLD AUTO: 10.1 FL (ref 7.4–10.4)
POTASSIUM SERPL-SCNC: 3.8 MMOL/L (ref 3.5–5.1)
PROT SERPL-MCNC: 7.3 GM/DL (ref 6.4–8.2)
RBC # BLD AUTO: 4.8 X10(6)/MCL (ref 4.5–5.9)
SODIUM SERPL-SCNC: 144 MMOL/L (ref 136–145)
TIBC SERPL-MCNC: 369 MCG/DL (ref 250–450)
TRANSFERRIN SERPL-MCNC: 290 MG/DL (ref 200–360)
WBC # SPEC AUTO: 5 X10(3)/MCL (ref 4.5–11)

## 2019-08-13 ENCOUNTER — HISTORICAL (OUTPATIENT)
Dept: RADIOLOGY | Facility: HOSPITAL | Age: 69
End: 2019-08-13

## 2019-10-17 ENCOUNTER — HISTORICAL (OUTPATIENT)
Dept: RADIOLOGY | Facility: HOSPITAL | Age: 69
End: 2019-10-17

## 2019-11-11 ENCOUNTER — HISTORICAL (OUTPATIENT)
Dept: ADMINISTRATIVE | Facility: HOSPITAL | Age: 69
End: 2019-11-11

## 2019-11-11 LAB
ABS NEUT (OLG): 6.6 X10(3)/MCL (ref 2.1–9.2)
ALBUMIN SERPL-MCNC: 3.4 GM/DL (ref 3.4–5)
ALBUMIN/GLOB SERPL: 0.7 RATIO (ref 1.1–2)
ALP SERPL-CCNC: 68 UNIT/L (ref 45–117)
ALT SERPL-CCNC: 16 UNIT/L (ref 12–78)
AST SERPL-CCNC: 14 UNIT/L (ref 15–37)
BASOPHILS # BLD AUTO: 0 X10(3)/MCL (ref 0–0.2)
BASOPHILS NFR BLD AUTO: 0 %
BILIRUB SERPL-MCNC: 0.8 MG/DL (ref 0.2–1)
BILIRUBIN DIRECT+TOT PNL SERPL-MCNC: 0.2 MG/DL (ref 0–0.2)
BILIRUBIN DIRECT+TOT PNL SERPL-MCNC: 0.6 MG/DL
BUN SERPL-MCNC: 9 MG/DL (ref 7–18)
CALCIUM SERPL-MCNC: 9.8 MG/DL (ref 8.5–10.1)
CHLORIDE SERPL-SCNC: 107 MMOL/L (ref 98–107)
CO2 SERPL-SCNC: 26 MMOL/L (ref 21–32)
CREAT SERPL-MCNC: 0.6 MG/DL (ref 0.6–1.3)
EOSINOPHIL # BLD AUTO: 0.1 X10(3)/MCL (ref 0–0.9)
EOSINOPHIL NFR BLD AUTO: 1 %
ERYTHROCYTE [DISTWIDTH] IN BLOOD BY AUTOMATED COUNT: 13.8 % (ref 11.5–14.5)
GLOBULIN SER-MCNC: 4.7 GM/ML (ref 2.3–3.5)
GLUCOSE SERPL-MCNC: 97 MG/DL (ref 74–106)
HCT VFR BLD AUTO: 37.9 % (ref 40–51)
HGB BLD-MCNC: 12.7 GM/DL (ref 13.5–17.5)
IMM GRANULOCYTES # BLD AUTO: 0.02 10*3/UL
IMM GRANULOCYTES NFR BLD AUTO: 0 %
LYMPHOCYTES # BLD AUTO: 1.1 X10(3)/MCL (ref 0.6–4.6)
LYMPHOCYTES NFR BLD AUTO: 13 %
MCH RBC QN AUTO: 30.2 PG (ref 26–34)
MCHC RBC AUTO-ENTMCNC: 33.5 GM/DL (ref 31–37)
MCV RBC AUTO: 90 FL (ref 80–100)
MONOCYTES # BLD AUTO: 0.9 X10(3)/MCL (ref 0.1–1.3)
MONOCYTES NFR BLD AUTO: 10 %
NEUTROPHILS # BLD AUTO: 6.6 X10(3)/MCL (ref 2.1–9.2)
NEUTROPHILS NFR BLD AUTO: 76 %
PLATELET # BLD AUTO: 323 X10(3)/MCL (ref 130–400)
PMV BLD AUTO: 10.3 FL (ref 7.4–10.4)
POTASSIUM SERPL-SCNC: 3.3 MMOL/L (ref 3.5–5.1)
PROT SERPL-MCNC: 8.1 GM/DL (ref 6.4–8.2)
RBC # BLD AUTO: 4.21 X10(6)/MCL (ref 4.5–5.9)
SODIUM SERPL-SCNC: 140 MMOL/L (ref 136–145)
WBC # SPEC AUTO: 8.7 X10(3)/MCL (ref 4.5–11)

## 2019-12-23 ENCOUNTER — HISTORICAL (OUTPATIENT)
Dept: HEMATOLOGY/ONCOLOGY | Facility: CLINIC | Age: 69
End: 2019-12-23

## 2019-12-23 LAB
ABS NEUT (OLG): 3.98 X10(3)/MCL (ref 2.1–9.2)
ALBUMIN SERPL-MCNC: 3.6 GM/DL (ref 3.4–5)
ALBUMIN/GLOB SERPL: 1 RATIO (ref 1.1–2)
ALP SERPL-CCNC: 79 UNIT/L (ref 45–117)
ALT SERPL-CCNC: 19 UNIT/L (ref 12–78)
AST SERPL-CCNC: 16 UNIT/L (ref 15–37)
BASOPHILS # BLD AUTO: 0 X10(3)/MCL (ref 0–0.2)
BASOPHILS NFR BLD AUTO: 0 %
BILIRUB SERPL-MCNC: 0.4 MG/DL (ref 0.2–1)
BILIRUBIN DIRECT+TOT PNL SERPL-MCNC: 0.2 MG/DL
BILIRUBIN DIRECT+TOT PNL SERPL-MCNC: 0.2 MG/DL (ref 0–0.2)
BUN SERPL-MCNC: 11 MG/DL (ref 7–18)
CALCIUM SERPL-MCNC: 9 MG/DL (ref 8.5–10.1)
CHLORIDE SERPL-SCNC: 108 MMOL/L (ref 98–107)
CO2 SERPL-SCNC: 29 MMOL/L (ref 21–32)
CREAT SERPL-MCNC: 0.9 MG/DL (ref 0.6–1.3)
EOSINOPHIL # BLD AUTO: 0.5 X10(3)/MCL (ref 0–0.9)
EOSINOPHIL NFR BLD AUTO: 8 %
ERYTHROCYTE [DISTWIDTH] IN BLOOD BY AUTOMATED COUNT: 14.7 % (ref 11.5–14.5)
GLOBULIN SER-MCNC: 3.5 GM/ML (ref 2.3–3.5)
GLUCOSE SERPL-MCNC: 97 MG/DL (ref 74–106)
HCT VFR BLD AUTO: 37 % (ref 40–51)
HGB BLD-MCNC: 11.9 GM/DL (ref 13.5–17.5)
IMM GRANULOCYTES # BLD AUTO: 0.02 10*3/UL
IMM GRANULOCYTES NFR BLD AUTO: 0 %
LYMPHOCYTES # BLD AUTO: 1.6 X10(3)/MCL (ref 0.6–4.6)
LYMPHOCYTES NFR BLD AUTO: 24 %
MCH RBC QN AUTO: 29.1 PG (ref 26–34)
MCHC RBC AUTO-ENTMCNC: 32.2 GM/DL (ref 31–37)
MCV RBC AUTO: 90.5 FL (ref 80–100)
MONOCYTES # BLD AUTO: 0.6 X10(3)/MCL (ref 0.1–1.3)
MONOCYTES NFR BLD AUTO: 9 %
NEUTROPHILS # BLD AUTO: 3.98 X10(3)/MCL (ref 2.1–9.2)
NEUTROPHILS NFR BLD AUTO: 59 %
PLATELET # BLD AUTO: 329 X10(3)/MCL (ref 130–400)
PMV BLD AUTO: 9.5 FL (ref 7.4–10.4)
POTASSIUM SERPL-SCNC: 3.5 MMOL/L (ref 3.5–5.1)
PROT SERPL-MCNC: 7.1 GM/DL (ref 6.4–8.2)
RBC # BLD AUTO: 4.09 X10(6)/MCL (ref 4.5–5.9)
SODIUM SERPL-SCNC: 142 MMOL/L (ref 136–145)
WBC # SPEC AUTO: 6.8 X10(3)/MCL (ref 4.5–11)

## 2020-01-10 ENCOUNTER — HISTORICAL (OUTPATIENT)
Dept: ADMINISTRATIVE | Facility: HOSPITAL | Age: 70
End: 2020-01-10

## 2020-01-10 LAB
T4 FREE SERPL-MCNC: 0.87 NG/DL (ref 0.76–1.46)
TSH SERPL-ACNC: 0.76 MIU/L (ref 0.36–3.74)

## 2020-03-10 ENCOUNTER — HISTORICAL (OUTPATIENT)
Dept: HEMATOLOGY/ONCOLOGY | Facility: CLINIC | Age: 70
End: 2020-03-10

## 2020-03-10 LAB
ABS NEUT (OLG): 2.87 X10(3)/MCL (ref 2.1–9.2)
ALBUMIN SERPL-MCNC: 3.7 GM/DL (ref 3.4–5)
ALBUMIN/GLOB SERPL: 1 RATIO (ref 1.1–2)
ALP SERPL-CCNC: 71 UNIT/L (ref 45–117)
ALT SERPL-CCNC: 29 UNIT/L (ref 12–78)
AST SERPL-CCNC: 26 UNIT/L (ref 15–37)
BASOPHILS # BLD AUTO: 0 X10(3)/MCL (ref 0–0.2)
BASOPHILS NFR BLD AUTO: 0 %
BILIRUB SERPL-MCNC: 0.3 MG/DL (ref 0.2–1)
BILIRUBIN DIRECT+TOT PNL SERPL-MCNC: <0.1 MG/DL (ref 0–0.2)
BILIRUBIN DIRECT+TOT PNL SERPL-MCNC: ABNORMAL MG/DL
BUN SERPL-MCNC: 7 MG/DL (ref 7–18)
CALCIUM SERPL-MCNC: 9.3 MG/DL (ref 8.5–10.1)
CHLORIDE SERPL-SCNC: 109 MMOL/L (ref 98–107)
CO2 SERPL-SCNC: 27 MMOL/L (ref 21–32)
CREAT SERPL-MCNC: 1 MG/DL (ref 0.6–1.3)
EOSINOPHIL # BLD AUTO: 0.4 X10(3)/MCL (ref 0–0.9)
EOSINOPHIL NFR BLD AUTO: 8 %
ERYTHROCYTE [DISTWIDTH] IN BLOOD BY AUTOMATED COUNT: 17.7 % (ref 11.5–14.5)
GLOBULIN SER-MCNC: 3.6 GM/ML (ref 2.3–3.5)
GLUCOSE SERPL-MCNC: 96 MG/DL (ref 74–106)
HCT VFR BLD AUTO: 39.7 % (ref 40–51)
HGB BLD-MCNC: 12.5 GM/DL (ref 13.5–17.5)
IMM GRANULOCYTES # BLD AUTO: 0.02 10*3/UL
IMM GRANULOCYTES NFR BLD AUTO: 0 %
LYMPHOCYTES # BLD AUTO: 1.6 X10(3)/MCL (ref 0.6–4.6)
LYMPHOCYTES NFR BLD AUTO: 30 %
MCH RBC QN AUTO: 25 PG (ref 26–34)
MCHC RBC AUTO-ENTMCNC: 31.5 GM/DL (ref 31–37)
MCV RBC AUTO: 79.4 FL (ref 80–100)
MONOCYTES # BLD AUTO: 0.5 X10(3)/MCL (ref 0.1–1.3)
MONOCYTES NFR BLD AUTO: 9 %
NEUTROPHILS # BLD AUTO: 2.87 X10(3)/MCL (ref 2.1–9.2)
NEUTROPHILS NFR BLD AUTO: 52 %
PLATELET # BLD AUTO: 368 X10(3)/MCL (ref 130–400)
PMV BLD AUTO: 10.5 FL (ref 7.4–10.4)
POTASSIUM SERPL-SCNC: 3.5 MMOL/L (ref 3.5–5.1)
PROT SERPL-MCNC: 7.3 GM/DL (ref 6.4–8.2)
RBC # BLD AUTO: 5 X10(6)/MCL (ref 4.5–5.9)
SODIUM SERPL-SCNC: 142 MMOL/L (ref 136–145)
WBC # SPEC AUTO: 5.5 X10(3)/MCL (ref 4.5–11)

## 2020-04-29 ENCOUNTER — HISTORICAL (OUTPATIENT)
Dept: ADMINISTRATIVE | Facility: HOSPITAL | Age: 70
End: 2020-04-29

## 2020-04-29 LAB
ABS NEUT (OLG): 2.27 X10(3)/MCL (ref 2.1–9.2)
ALBUMIN SERPL-MCNC: 3.4 GM/DL (ref 3.4–5)
ALBUMIN/GLOB SERPL: 0.8 RATIO (ref 1.1–2)
ALP SERPL-CCNC: 75 UNIT/L (ref 45–117)
ALT SERPL-CCNC: 28 UNIT/L (ref 12–78)
AST SERPL-CCNC: 30 UNIT/L (ref 15–37)
BASOPHILS # BLD AUTO: 0 X10(3)/MCL (ref 0–0.2)
BASOPHILS NFR BLD AUTO: 1 %
BILIRUB SERPL-MCNC: 0.4 MG/DL (ref 0.2–1)
BILIRUBIN DIRECT+TOT PNL SERPL-MCNC: 0.2 MG/DL
BILIRUBIN DIRECT+TOT PNL SERPL-MCNC: 0.2 MG/DL (ref 0–0.2)
BUN SERPL-MCNC: 10 MG/DL (ref 7–18)
CALCIUM SERPL-MCNC: 9.2 MG/DL (ref 8.5–10.1)
CHLORIDE SERPL-SCNC: 109 MMOL/L (ref 98–107)
CO2 SERPL-SCNC: 25 MMOL/L (ref 21–32)
CREAT SERPL-MCNC: 0.8 MG/DL (ref 0.6–1.3)
EOSINOPHIL # BLD AUTO: 0.5 X10(3)/MCL (ref 0–0.9)
EOSINOPHIL NFR BLD AUTO: 10 %
ERYTHROCYTE [DISTWIDTH] IN BLOOD BY AUTOMATED COUNT: 22.2 % (ref 11.5–14.5)
GLOBULIN SER-MCNC: 4 GM/ML (ref 2.3–3.5)
GLUCOSE SERPL-MCNC: 96 MG/DL (ref 74–106)
HCT VFR BLD AUTO: 35.9 % (ref 40–51)
HGB BLD-MCNC: 11.5 GM/DL (ref 13.5–17.5)
IMM GRANULOCYTES # BLD AUTO: 0.01 10*3/UL
IMM GRANULOCYTES NFR BLD AUTO: 0 %
LYMPHOCYTES # BLD AUTO: 1.2 X10(3)/MCL (ref 0.6–4.6)
LYMPHOCYTES NFR BLD AUTO: 27 %
MCH RBC QN AUTO: 27.1 PG (ref 26–34)
MCHC RBC AUTO-ENTMCNC: 32 GM/DL (ref 31–37)
MCV RBC AUTO: 84.5 FL (ref 80–100)
MONOCYTES # BLD AUTO: 0.5 X10(3)/MCL (ref 0.1–1.3)
MONOCYTES NFR BLD AUTO: 11 %
NEUTROPHILS # BLD AUTO: 2.27 X10(3)/MCL (ref 2.1–9.2)
NEUTROPHILS NFR BLD AUTO: 51 %
PLATELET # BLD AUTO: 391 X10(3)/MCL (ref 130–400)
PMV BLD AUTO: 9.3 FL (ref 7.4–10.4)
POTASSIUM SERPL-SCNC: 3.6 MMOL/L (ref 3.5–5.1)
PROT SERPL-MCNC: 7.4 GM/DL (ref 6.4–8.2)
RBC # BLD AUTO: 4.25 X10(6)/MCL (ref 4.5–5.9)
SODIUM SERPL-SCNC: 143 MMOL/L (ref 136–145)
T4 FREE SERPL-MCNC: 1.21 NG/DL (ref 0.76–1.46)
TSH SERPL-ACNC: 0.5 MIU/L (ref 0.36–3.74)
WBC # SPEC AUTO: 4.4 X10(3)/MCL (ref 4.5–11)

## 2020-05-05 ENCOUNTER — HISTORICAL (OUTPATIENT)
Dept: ENDOSCOPY | Facility: HOSPITAL | Age: 70
End: 2020-05-05

## 2020-05-05 LAB
ABS NEUT (OLG): 1.61 X10(3)/MCL (ref 2.1–9.2)
BASOPHILS # BLD AUTO: 0 X10(3)/MCL (ref 0–0.2)
BASOPHILS NFR BLD AUTO: 1 %
EOSINOPHIL # BLD AUTO: 0.5 X10(3)/MCL (ref 0–0.9)
EOSINOPHIL NFR BLD AUTO: 14 %
ERYTHROCYTE [DISTWIDTH] IN BLOOD BY AUTOMATED COUNT: 22 % (ref 11.5–14.5)
HCT VFR BLD AUTO: 34.9 % (ref 40–51)
HGB BLD-MCNC: 11.3 GM/DL (ref 13.5–17.5)
IMM GRANULOCYTES # BLD AUTO: 0.01 10*3/UL
IMM GRANULOCYTES NFR BLD AUTO: 0 %
INR PPP: 0.99 (ref 0.9–1.2)
LYMPHOCYTES # BLD AUTO: 1.1 X10(3)/MCL (ref 0.6–4.6)
LYMPHOCYTES NFR BLD AUTO: 29 %
MCH RBC QN AUTO: 27.4 PG (ref 26–34)
MCHC RBC AUTO-ENTMCNC: 32.4 GM/DL (ref 31–37)
MCV RBC AUTO: 84.5 FL (ref 80–100)
MONOCYTES # BLD AUTO: 0.4 X10(3)/MCL (ref 0.1–1.3)
MONOCYTES NFR BLD AUTO: 11 %
NEUTROPHILS # BLD AUTO: 1.61 X10(3)/MCL (ref 2.1–9.2)
NEUTROPHILS NFR BLD AUTO: 44 %
PLATELET # BLD AUTO: 360 X10(3)/MCL (ref 130–400)
PMV BLD AUTO: 9.4 FL (ref 7.4–10.4)
PROTHROMBIN TIME: 13 SECOND(S) (ref 11.9–14.4)
RBC # BLD AUTO: 4.13 X10(6)/MCL (ref 4.5–5.9)
WBC # SPEC AUTO: 3.6 X10(3)/MCL (ref 4.5–11)

## 2020-05-21 ENCOUNTER — HISTORICAL (OUTPATIENT)
Dept: ADMINISTRATIVE | Facility: HOSPITAL | Age: 70
End: 2020-05-21

## 2020-05-21 LAB
ABS NEUT (OLG): 2.49 X10(3)/MCL (ref 2.1–9.2)
ALBUMIN SERPL-MCNC: 3.3 GM/DL (ref 3.4–5)
ALBUMIN/GLOB SERPL: 1 RATIO (ref 1.1–2)
ALP SERPL-CCNC: 80 UNIT/L (ref 45–117)
ALT SERPL-CCNC: 18 UNIT/L (ref 12–78)
APPEARANCE, UA: CLEAR
AST SERPL-CCNC: 23 UNIT/L (ref 15–37)
BACTERIA #/AREA URNS AUTO: ABNORMAL /HPF
BASOPHILS # BLD AUTO: 0 X10(3)/MCL (ref 0–0.2)
BASOPHILS NFR BLD AUTO: 1 %
BILIRUB SERPL-MCNC: 0.5 MG/DL (ref 0.2–1)
BILIRUB UR QL STRIP: NEGATIVE
BILIRUBIN DIRECT+TOT PNL SERPL-MCNC: 0.2 MG/DL (ref 0–0.2)
BILIRUBIN DIRECT+TOT PNL SERPL-MCNC: 0.3 MG/DL
BUN SERPL-MCNC: 10 MG/DL (ref 7–18)
CALCIUM SERPL-MCNC: 8.7 MG/DL (ref 8.5–10.1)
CHLORIDE SERPL-SCNC: 112 MMOL/L (ref 98–107)
CO2 SERPL-SCNC: 25 MMOL/L (ref 21–32)
COLOR UR: YELLOW
CREAT SERPL-MCNC: 0.8 MG/DL (ref 0.6–1.3)
EOSINOPHIL # BLD AUTO: 0.3 X10(3)/MCL (ref 0–0.9)
EOSINOPHIL NFR BLD AUTO: 6 %
ERYTHROCYTE [DISTWIDTH] IN BLOOD BY AUTOMATED COUNT: 20.1 % (ref 11.5–14.5)
GLOBULIN SER-MCNC: 3.4 GM/ML (ref 2.3–3.5)
GLUCOSE (UA): NEGATIVE
GLUCOSE SERPL-MCNC: 99 MG/DL (ref 74–106)
HCT VFR BLD AUTO: 33.6 % (ref 40–51)
HGB BLD-MCNC: 10.8 GM/DL (ref 13.5–17.5)
HGB UR QL STRIP: NEGATIVE
HYALINE CASTS #/AREA URNS LPF: ABNORMAL /LPF
IMM GRANULOCYTES # BLD AUTO: 0.01 10*3/UL
IMM GRANULOCYTES NFR BLD AUTO: 0 %
KETONES UR QL STRIP: ABNORMAL
LEUKOCYTE ESTERASE UR QL STRIP: NEGATIVE
LYMPHOCYTES # BLD AUTO: 1.3 X10(3)/MCL (ref 0.6–4.6)
LYMPHOCYTES NFR BLD AUTO: 28 %
MAGNESIUM SERPL-MCNC: 1.3 MG/DL (ref 1.6–2.6)
MCH RBC QN AUTO: 28 PG (ref 26–34)
MCHC RBC AUTO-ENTMCNC: 32.1 GM/DL (ref 31–37)
MCV RBC AUTO: 87 FL (ref 80–100)
MONOCYTES # BLD AUTO: 0.5 X10(3)/MCL (ref 0.1–1.3)
MONOCYTES NFR BLD AUTO: 11 %
NEUTROPHILS # BLD AUTO: 2.49 X10(3)/MCL (ref 2.1–9.2)
NEUTROPHILS NFR BLD AUTO: 54 %
NITRITE UR QL STRIP: NEGATIVE
PH UR STRIP: 6 [PH] (ref 4.5–8)
PLATELET # BLD AUTO: 284 X10(3)/MCL (ref 130–400)
PMV BLD AUTO: 9.6 FL (ref 7.4–10.4)
POTASSIUM SERPL-SCNC: 3.2 MMOL/L (ref 3.5–5.1)
PROT SERPL-MCNC: 6.7 GM/DL (ref 6.4–8.2)
PROT UR QL STRIP: 70 MG/DL
RBC # BLD AUTO: 3.86 X10(6)/MCL (ref 4.5–5.9)
RBC #/AREA URNS AUTO: ABNORMAL /HPF
SODIUM SERPL-SCNC: 143 MMOL/L (ref 136–145)
SP GR UR STRIP: 1.03 (ref 1–1.03)
SQUAMOUS #/AREA URNS LPF: ABNORMAL /LPF
UROBILINOGEN UR STRIP-ACNC: NORMAL
WBC # SPEC AUTO: 4.6 X10(3)/MCL (ref 4.5–11)
WBC #/AREA URNS AUTO: ABNORMAL /HPF

## 2020-05-23 LAB — FINAL CULTURE: NO GROWTH

## 2020-05-28 ENCOUNTER — HISTORICAL (OUTPATIENT)
Dept: HEMATOLOGY/ONCOLOGY | Facility: CLINIC | Age: 70
End: 2020-05-28

## 2020-05-28 LAB
ALBUMIN SERPL-MCNC: 3.2 GM/DL (ref 3.4–5)
ALBUMIN/GLOB SERPL: 0.9 RATIO (ref 1.1–2)
ALP SERPL-CCNC: 73 UNIT/L (ref 45–117)
ALT SERPL-CCNC: 18 UNIT/L (ref 12–78)
AST SERPL-CCNC: 26 UNIT/L (ref 15–37)
BILIRUB SERPL-MCNC: 0.5 MG/DL (ref 0.2–1)
BILIRUBIN DIRECT+TOT PNL SERPL-MCNC: 0.1 MG/DL (ref 0–0.2)
BILIRUBIN DIRECT+TOT PNL SERPL-MCNC: 0.4 MG/DL
BUN SERPL-MCNC: 7 MG/DL (ref 7–18)
CALCIUM SERPL-MCNC: 8.6 MG/DL (ref 8.5–10.1)
CHLORIDE SERPL-SCNC: 112 MMOL/L (ref 98–107)
CO2 SERPL-SCNC: 24 MMOL/L (ref 21–32)
CREAT SERPL-MCNC: 0.8 MG/DL (ref 0.6–1.3)
GLOBULIN SER-MCNC: 3.5 GM/ML (ref 2.3–3.5)
GLUCOSE SERPL-MCNC: 90 MG/DL (ref 74–106)
MAGNESIUM SERPL-MCNC: 1.3 MG/DL (ref 1.6–2.6)
POTASSIUM SERPL-SCNC: 3.5 MMOL/L (ref 3.5–5.1)
PROT SERPL-MCNC: 6.7 GM/DL (ref 6.4–8.2)
SODIUM SERPL-SCNC: 142 MMOL/L (ref 136–145)

## 2020-06-08 ENCOUNTER — HISTORICAL (OUTPATIENT)
Dept: HEMATOLOGY/ONCOLOGY | Facility: CLINIC | Age: 70
End: 2020-06-08

## 2020-06-08 LAB
ABS NEUT (OLG): 3.53 X10(3)/MCL (ref 2.1–9.2)
ALBUMIN SERPL-MCNC: 3.4 GM/DL (ref 3.4–5)
ALBUMIN/GLOB SERPL: 1.1 RATIO (ref 1.1–2)
ALP SERPL-CCNC: 65 UNIT/L (ref 45–117)
ALT SERPL-CCNC: 23 UNIT/L (ref 12–78)
AST SERPL-CCNC: 22 UNIT/L (ref 15–37)
BASOPHILS # BLD AUTO: 0 X10(3)/MCL (ref 0–0.2)
BASOPHILS NFR BLD AUTO: 0 %
BILIRUB SERPL-MCNC: 0.6 MG/DL (ref 0.2–1)
BILIRUBIN DIRECT+TOT PNL SERPL-MCNC: 0.2 MG/DL (ref 0–0.2)
BILIRUBIN DIRECT+TOT PNL SERPL-MCNC: 0.4 MG/DL
BUN SERPL-MCNC: 9 MG/DL (ref 7–18)
CALCIUM SERPL-MCNC: 9.1 MG/DL (ref 8.5–10.1)
CHLORIDE SERPL-SCNC: 112 MMOL/L (ref 98–107)
CO2 SERPL-SCNC: 24 MMOL/L (ref 21–32)
CREAT SERPL-MCNC: 0.8 MG/DL (ref 0.6–1.3)
EOSINOPHIL # BLD AUTO: 0.1 X10(3)/MCL (ref 0–0.9)
EOSINOPHIL NFR BLD AUTO: 2 %
ERYTHROCYTE [DISTWIDTH] IN BLOOD BY AUTOMATED COUNT: 17.9 % (ref 11.5–14.5)
GLOBULIN SER-MCNC: 3.2 GM/ML (ref 2.3–3.5)
GLUCOSE SERPL-MCNC: 90 MG/DL (ref 74–106)
HCT VFR BLD AUTO: 32 % (ref 40–51)
HGB BLD-MCNC: 10.3 GM/DL (ref 13.5–17.5)
IMM GRANULOCYTES # BLD AUTO: 0.01 10*3/UL
IMM GRANULOCYTES NFR BLD AUTO: 0 %
LYMPHOCYTES # BLD AUTO: 1.4 X10(3)/MCL (ref 0.6–4.6)
LYMPHOCYTES NFR BLD AUTO: 25 %
MAGNESIUM SERPL-MCNC: 1.2 MG/DL (ref 1.6–2.6)
MCH RBC QN AUTO: 28.3 PG (ref 26–34)
MCHC RBC AUTO-ENTMCNC: 32.2 GM/DL (ref 31–37)
MCV RBC AUTO: 87.9 FL (ref 80–100)
MONOCYTES # BLD AUTO: 0.5 X10(3)/MCL (ref 0.1–1.3)
MONOCYTES NFR BLD AUTO: 10 %
NEUTROPHILS # BLD AUTO: 3.53 X10(3)/MCL (ref 2.1–9.2)
NEUTROPHILS NFR BLD AUTO: 63 %
PLATELET # BLD AUTO: 327 X10(3)/MCL (ref 130–400)
PMV BLD AUTO: 9.7 FL (ref 7.4–10.4)
POTASSIUM SERPL-SCNC: 3.4 MMOL/L (ref 3.5–5.1)
PROT SERPL-MCNC: 6.6 GM/DL (ref 6.4–8.2)
RBC # BLD AUTO: 3.64 X10(6)/MCL (ref 4.5–5.9)
SODIUM SERPL-SCNC: 142 MMOL/L (ref 136–145)
WBC # SPEC AUTO: 5.6 X10(3)/MCL (ref 4.5–11)

## 2020-06-17 ENCOUNTER — HISTORICAL (OUTPATIENT)
Dept: ADMINISTRATIVE | Facility: HOSPITAL | Age: 70
End: 2020-06-17

## 2020-06-17 LAB
ALBUMIN SERPL-MCNC: 3.4 GM/DL (ref 3.4–5)
ALBUMIN/GLOB SERPL: 1 RATIO (ref 1.1–2)
ALP SERPL-CCNC: 59 UNIT/L (ref 45–117)
ALT SERPL-CCNC: 22 UNIT/L (ref 12–78)
AST SERPL-CCNC: 17 UNIT/L (ref 15–37)
BILIRUB SERPL-MCNC: 0.3 MG/DL (ref 0.2–1)
BILIRUBIN DIRECT+TOT PNL SERPL-MCNC: 0.1 MG/DL (ref 0–0.2)
BILIRUBIN DIRECT+TOT PNL SERPL-MCNC: 0.2 MG/DL
BUN SERPL-MCNC: 10 MG/DL (ref 7–18)
CALCIUM SERPL-MCNC: 8.4 MG/DL (ref 8.5–10.1)
CHLORIDE SERPL-SCNC: 114 MMOL/L (ref 98–107)
CO2 SERPL-SCNC: 20 MMOL/L (ref 21–32)
CREAT SERPL-MCNC: 0.8 MG/DL (ref 0.6–1.3)
GLOBULIN SER-MCNC: 3.4 GM/ML (ref 2.3–3.5)
GLUCOSE SERPL-MCNC: 102 MG/DL (ref 74–106)
MAGNESIUM SERPL-MCNC: 1.3 MG/DL (ref 1.6–2.6)
PHOSPHATE SERPL-MCNC: 2.1 MG/DL (ref 2.5–4.9)
POTASSIUM SERPL-SCNC: 3.5 MMOL/L (ref 3.5–5.1)
PROT SERPL-MCNC: 6.8 GM/DL (ref 6.4–8.2)
SODIUM SERPL-SCNC: 141 MMOL/L (ref 136–145)

## 2020-06-24 ENCOUNTER — HISTORICAL (OUTPATIENT)
Dept: HEMATOLOGY/ONCOLOGY | Facility: CLINIC | Age: 70
End: 2020-06-24

## 2020-06-24 LAB
ALBUMIN SERPL-MCNC: 3.5 GM/DL (ref 3.4–5)
ALBUMIN/GLOB SERPL: 1 RATIO (ref 1.1–2)
ALP SERPL-CCNC: 60 UNIT/L (ref 45–117)
ALT SERPL-CCNC: 22 UNIT/L (ref 12–78)
AST SERPL-CCNC: 20 UNIT/L (ref 15–37)
BILIRUB SERPL-MCNC: 0.4 MG/DL (ref 0.2–1)
BILIRUBIN DIRECT+TOT PNL SERPL-MCNC: 0.2 MG/DL
BILIRUBIN DIRECT+TOT PNL SERPL-MCNC: 0.2 MG/DL (ref 0–0.2)
BUN SERPL-MCNC: 12 MG/DL (ref 7–18)
CALCIUM SERPL-MCNC: 8.3 MG/DL (ref 8.5–10.1)
CHLORIDE SERPL-SCNC: 111 MMOL/L (ref 98–107)
CO2 SERPL-SCNC: 20 MMOL/L (ref 21–32)
CREAT SERPL-MCNC: 1 MG/DL (ref 0.6–1.3)
GLOBULIN SER-MCNC: 3.4 GM/ML (ref 2.3–3.5)
GLUCOSE SERPL-MCNC: 83 MG/DL (ref 74–106)
MAGNESIUM SERPL-MCNC: 1.2 MG/DL (ref 1.6–2.6)
POTASSIUM SERPL-SCNC: 3.6 MMOL/L (ref 3.5–5.1)
PROT SERPL-MCNC: 6.9 GM/DL (ref 6.4–8.2)
SODIUM SERPL-SCNC: 140 MMOL/L (ref 136–145)

## 2020-07-28 ENCOUNTER — HISTORICAL (OUTPATIENT)
Dept: RADIOLOGY | Facility: HOSPITAL | Age: 70
End: 2020-07-28

## 2020-07-28 LAB
ABS NEUT (OLG): 2.27 X10(3)/MCL (ref 2.1–9.2)
ALBUMIN SERPL-MCNC: 3.8 GM/DL (ref 3.4–5)
ALBUMIN/GLOB SERPL: 1.1 RATIO (ref 1.1–2)
ALP SERPL-CCNC: 74 UNIT/L (ref 45–117)
ALT SERPL-CCNC: 21 UNIT/L (ref 12–78)
AST SERPL-CCNC: 25 UNIT/L (ref 15–37)
BASOPHILS # BLD AUTO: 0 X10(3)/MCL (ref 0–0.2)
BASOPHILS NFR BLD AUTO: 1 %
BILIRUB SERPL-MCNC: 0.4 MG/DL (ref 0.2–1)
BILIRUBIN DIRECT+TOT PNL SERPL-MCNC: 0.2 MG/DL
BILIRUBIN DIRECT+TOT PNL SERPL-MCNC: 0.2 MG/DL (ref 0–0.2)
BUN SERPL-MCNC: 7 MG/DL (ref 7–18)
CALCIUM SERPL-MCNC: 9 MG/DL (ref 8.5–10.1)
CHLORIDE SERPL-SCNC: 109 MMOL/L (ref 98–107)
CO2 SERPL-SCNC: 25 MMOL/L (ref 21–32)
CREAT SERPL-MCNC: 0.7 MG/DL (ref 0.6–1.3)
CRP SERPL-MCNC: <0.3 MG/DL
EOSINOPHIL # BLD AUTO: 0.4 X10(3)/MCL (ref 0–0.9)
EOSINOPHIL NFR BLD AUTO: 8 %
ERYTHROCYTE [DISTWIDTH] IN BLOOD BY AUTOMATED COUNT: 16.4 % (ref 11.5–14.5)
GLOBULIN SER-MCNC: 3.4 GM/ML (ref 2.3–3.5)
GLUCOSE SERPL-MCNC: 80 MG/DL (ref 74–106)
HCT VFR BLD AUTO: 33.9 % (ref 40–51)
HGB BLD-MCNC: 10.8 GM/DL (ref 13.5–17.5)
LYMPHOCYTES # BLD AUTO: 1.6 X10(3)/MCL (ref 0.6–4.6)
LYMPHOCYTES NFR BLD AUTO: 34 %
MCH RBC QN AUTO: 29.4 PG (ref 26–34)
MCHC RBC AUTO-ENTMCNC: 31.9 GM/DL (ref 31–37)
MCV RBC AUTO: 92.4 FL (ref 80–100)
MONOCYTES # BLD AUTO: 0.4 X10(3)/MCL (ref 0.1–1.3)
MONOCYTES NFR BLD AUTO: 8 %
NEUTROPHILS # BLD AUTO: 2.27 X10(3)/MCL (ref 2.1–9.2)
NEUTROPHILS NFR BLD AUTO: 49 %
PLATELET # BLD AUTO: 351 X10(3)/MCL (ref 130–400)
PMV BLD AUTO: 9.8 FL (ref 7.4–10.4)
POTASSIUM SERPL-SCNC: 3.5 MMOL/L (ref 3.5–5.1)
PROT SERPL-MCNC: 7.2 GM/DL (ref 6.4–8.2)
RBC # BLD AUTO: 3.67 X10(6)/MCL (ref 4.5–5.9)
SODIUM SERPL-SCNC: 141 MMOL/L (ref 136–145)
T4 FREE SERPL-MCNC: 1.03 NG/DL (ref 0.76–1.46)
TSH SERPL-ACNC: 0.81 MIU/L (ref 0.36–3.74)
WBC # SPEC AUTO: 4.6 X10(3)/MCL (ref 4.5–11)

## 2020-07-30 ENCOUNTER — HISTORICAL (OUTPATIENT)
Dept: LAB | Facility: HOSPITAL | Age: 70
End: 2020-07-30

## 2020-07-30 LAB
CHOLEST SERPL-MCNC: 81 MG/DL
CHOLEST/HDLC SERPL: 1.6 {RATIO} (ref 0–5)
ERYTHROCYTE [SEDIMENTATION RATE] IN BLOOD: 8 MM/HR (ref 0–15)
HDLC SERPL-MCNC: 50 MG/DL (ref 40–59)
LDLC SERPL CALC-MCNC: 10 MG/DL
TRIGL SERPL-MCNC: 103 MG/DL
VLDLC SERPL CALC-MCNC: 21 MG/DL

## 2020-09-21 ENCOUNTER — HISTORICAL (OUTPATIENT)
Dept: HEMATOLOGY/ONCOLOGY | Facility: CLINIC | Age: 70
End: 2020-09-21

## 2020-09-21 LAB
ABS NEUT (OLG): 3.3 X10(3)/MCL (ref 2.1–9.2)
ALBUMIN SERPL-MCNC: 3.7 GM/DL (ref 3.4–5)
ALBUMIN/GLOB SERPL: 1.1 RATIO (ref 1.1–2)
ALP SERPL-CCNC: 67 UNIT/L (ref 45–117)
ALT SERPL-CCNC: 27 UNIT/L (ref 12–78)
AST SERPL-CCNC: 25 UNIT/L (ref 15–37)
BASOPHILS # BLD AUTO: 0 X10(3)/MCL (ref 0–0.2)
BASOPHILS NFR BLD AUTO: 1 %
BILIRUB SERPL-MCNC: 0.5 MG/DL (ref 0.2–1)
BILIRUBIN DIRECT+TOT PNL SERPL-MCNC: 0.2 MG/DL (ref 0–0.2)
BILIRUBIN DIRECT+TOT PNL SERPL-MCNC: 0.3 MG/DL
BUN SERPL-MCNC: 11 MG/DL (ref 7–18)
CALCIUM SERPL-MCNC: 8.9 MG/DL (ref 8.5–10.1)
CHLORIDE SERPL-SCNC: 109 MMOL/L (ref 98–107)
CO2 SERPL-SCNC: 26 MMOL/L (ref 21–32)
CREAT SERPL-MCNC: 0.9 MG/DL (ref 0.6–1.3)
EOSINOPHIL # BLD AUTO: 0.3 X10(3)/MCL (ref 0–0.9)
EOSINOPHIL NFR BLD AUTO: 5 %
ERYTHROCYTE [DISTWIDTH] IN BLOOD BY AUTOMATED COUNT: 16.6 % (ref 11.5–14.5)
GLOBULIN SER-MCNC: 3.3 GM/ML (ref 2.3–3.5)
GLUCOSE SERPL-MCNC: 90 MG/DL (ref 82–115)
HCT VFR BLD AUTO: 37.4 % (ref 40–51)
HGB BLD-MCNC: 11.9 GM/DL (ref 13.5–17.5)
IMM GRANULOCYTES # BLD AUTO: 0.01 10*3/UL
IMM GRANULOCYTES NFR BLD AUTO: 0 %
LYMPHOCYTES # BLD AUTO: 1.7 X10(3)/MCL (ref 0.6–4.6)
LYMPHOCYTES NFR BLD AUTO: 28 %
MCH RBC QN AUTO: 28.6 PG (ref 26–34)
MCHC RBC AUTO-ENTMCNC: 31.8 GM/DL (ref 31–37)
MCV RBC AUTO: 89.9 FL (ref 80–100)
MONOCYTES # BLD AUTO: 0.6 X10(3)/MCL (ref 0.1–1.3)
MONOCYTES NFR BLD AUTO: 10 %
NEUTROPHILS # BLD AUTO: 3.3 X10(3)/MCL (ref 2.1–9.2)
NEUTROPHILS NFR BLD AUTO: 56 %
PLATELET # BLD AUTO: 347 X10(3)/MCL (ref 130–400)
PMV BLD AUTO: 9.8 FL (ref 7.4–10.4)
POTASSIUM SERPL-SCNC: 3.8 MMOL/L (ref 3.5–5.1)
PROT SERPL-MCNC: 7 GM/DL (ref 6.4–8.2)
RBC # BLD AUTO: 4.16 X10(6)/MCL (ref 4.5–5.9)
SODIUM SERPL-SCNC: 141 MMOL/L (ref 136–145)
WBC # SPEC AUTO: 5.9 X10(3)/MCL (ref 4.5–11)

## 2022-04-10 ENCOUNTER — HISTORICAL (OUTPATIENT)
Dept: ADMINISTRATIVE | Facility: HOSPITAL | Age: 72
End: 2022-04-10

## 2022-04-26 VITALS
DIASTOLIC BLOOD PRESSURE: 84 MMHG | BODY MASS INDEX: 19.96 KG/M2 | WEIGHT: 116.88 LBS | OXYGEN SATURATION: 97 % | HEIGHT: 64 IN | SYSTOLIC BLOOD PRESSURE: 134 MMHG

## 2022-05-03 NOTE — HISTORICAL [facility]
This is a historical note converted from [employer]. Formatting and pictures may have been removed.  Please reference [employer] for original formatting and attached multimedia. History of Present Illness  ?  ??Problem List:  ??1. Chronic Myelogenous Leukemia. ?Diagnosed .  ?FISH: Abnormal result: 92.5% of nuclei positive for an atypical single BCR-ABL fusion signal.  ???- Deletion of 9q34 (associated with a poor prognosis in some reports, although this is controversial)  ??3. CAD  ??4. CVA  ??5. Anticoagulated. On coumadin 10 mg daily.?  ??6. Iron deficiency anemia  ???  ???  ??Treatment History:  ??Gleevec 400 mg qday. Started on 4/21/10. Patient stopped due to miscommunication and was restarted on 11/15/11. He stopped in  due to inability to get from pharmacy.  ?   ???  History of present illness:  ?? presented to have labs drawn with PCP. Patient was found to have an elevated WBC count (44.6). He denied feeling bad, fever, pain, headaches, or nausea/vomiting. The lab was repeated and resulted WBC 37.7. Subsequent peripheral blood smear revealed WBC 44,600 with 60% neutrophils, 10% bands, 11% lymphocytes, 4% monocytes, 3% eosinophils, 4% basophils, 2% metamyelocytes,5% myelocytes, 1% promyelocytes, and 1% blasts. Hemoglobin 5.47, Hematocrit 47.2%. Platelet count 456,000. Leukocytosis. Left shift of WBC s to level of blasts (less than 2%). Increased numbers of basophils and eosinophils. No dysplastic changes identified in erythroid or myeloid cells.?  ??Fluoresecence in-situ hybridization (FISH) result revealed abnormal result of the followin.5% of nuclei positive for an atypical single BCR-ABL fusion signal. - Deletion of 9q34. The abnormal BCR-ABL gene fusion signal pattern, in conjunction with leukocytosis with basophilia and eosinophilia, is most consistent with Chronic Myelogenous Leukemia. Note: A deletion of 9q34 residual ABL1 gene has been associated with a subclass of CML with a poor prognosis in  some reports, although this is controversial.  ???  ??On 4/21/10, the patient was started on Gleevec. WBCs returned to normal until 11/14/11 showed WBC 69.2. The patient had stopped taking his medication s/t miscommunication between his PCP. Once restarting on 11/15/11, WBCs returned to normal. ?After this he did not show up for his 6 scheduled appointments.  ??July 2014 WBC=4.3 (stable since April 2013). ?RQ-PCR BCR/ABL: No BCR-ABL1 mutations were detected by next-generation sequencing of codons . ?  ??October 2014 ?WBC= 4.1, BCR/ABL was positive with 0.11% detected. WBC=4.1. However, again noncompliant.?  ??Januarary 2015 ?BCR-ABL= 4.6495%.?  ??April 2015 BCR-ABL= 0.1506, indicating treatment response. He is exhibiting positive major BCR-ABL indicating complete + partial cytogenetic response.?  ??August 2015 BCR-ABL= 0.0330% indicating major molecular response. His WBC was 6.6 and his platelets are 314K, indicating hematologic response. ?2 weeks prior, he came in for labs in preparation for this appt and his H&H was low at 6.2/20.5. He was given 3 units of blood with good response.  ??Colonoscopy on 3/7/16 that was negative for malignancy. He did have a hyperplastic polyp removed. ?(of note patient was taking plavix, asa and indomethacin)  ???  Feburary 2016 BCR-ABL= 7.3645% increased from 0.0773% in November 2015. He admits to being noncompliant.  ??November 2016 BCR-ABL= 0.02%?  ??January 2017 BCR-ABL= 0.038%  ??Transfused 2 units PRBC 01/05/17 for hgb 7.5. Patient profoundly iron deficient.  ??June 13, 2017 BCR-ABL= 0.0717% (still skipping a few doses intermittently)?  ?  ?  Interval History?  ?   03/17/2020:  -Extremely noncompliant with follow-ups.? Cardiology wanted to see him for advice about holding anticoagulation and antiplatelet therapy prior to bone marrow aspiration and core biopsy but he failed to show up to the appointment on 02/18/2020.? Cardiology want to see him because of history of stent  placement and DVT  -03/10/2020: b2a2 0.5835%; b3a2 undetectable; e1a2 undetectable  Presents for follow-up visit.? Admits that?he misses the back?may be?2 days a week when he is upset.? Denies undue weakness, fatigue, fevers, chills, night sweats, anorexia, etc.? No bleeding whatsoever.? Chronically anticoagulated with Eliquis?for blood clots. ?Follows up with cardiology/vascular surgery.??  ?   05/21/2020:  -Has been very noncompliant with follow-ups and with bone marrow biopsy.? Bone marrow biopsy was requested on 03/17/2020 back to have to be brought in again for a follow-up visit on 04/29/2022 update history and physical prior to the procedure.  -05/05/2020: Bone marrow aspiration and core biopsy: Mildly hypocellular bone marrow with trilineage hematopoiesis and maturation; bone marrow cellularity approximately 20%; positive for BCR-ABL 1 transcripts, b2a2 (p210) (0.069%)  -05/21/2020: CMP unremarkable.? Potassium 3.2.? CBC unremarkable except hemoglobin 10.8, stable.  Physical follow-up visit.  Clinically well, without any symptoms of concern.? No weakness, fatigue, malaise, recurrent fevers or chills, neurological symptoms, etc.? States that for last 2 weeks, has noticed brown-colored urine. ?No dysuria, frequency of micturition,?dark red urine, fevers, or chills. ?No trouble starting urine.? No urine dribbling.? No abdominal pain, nausea or vomiting. ?No pelvic pain.? No problems with bowels.  ?  ?  Review of Systems?  ?????12 point review of systems done in full with pertinent positives as described in interval history. Remainder of review of systems done in full and unremarkable.?  ???  ?   Physical Examination:  VITAL SIGNS: ?Reviewed. ? ?  GENERAL:? In no apparent distress.?  HEAD:? No signs of head trauma.  EYES:? Pupils are equal.? Extraocular motions intact.?  EARS:? Hearing grossly intact.  MOUTH:? Oropharynx is normal.  NECK:? No adenopathy, no JVD.??  CHEST:? Chest with clear breath sounds  bilaterally.? No wheezes, rales, or rhonchi.?  CARDIAC:? Regular rate and rhythm.? S1 and S2, without murmurs, gallops, or rubs.  VASCULAR:? No Edema.? Peripheral pulses normal and equal in all extremities.  ABDOMEN:? Soft, without detectable tenderness.? No sign of distention.? No?? rebound or guarding, and no masses palpated.?? Bowel Sounds normal.  MUSCULOSKELETAL:? Good range of motion of all major joints. Extremities without clubbing, cyanosis or edema.?  NEUROLOGIC EXAM:? Alert and oriented x 3.? No focal sensory or strength deficits.?? Speech normal.? Follows commands.  PSYCHIATRIC:? Mood normal.  SKIN:? No rash or lesions.  Bilateral lower extremity above-knee amputee.?  ?  ?   Assessment:??????  ?????1. Chronic Myelogenous Leukemia. ?Diagnosed 2010.  ?FISH: Abnormal result: 92.5% of nuclei positive for an atypical single BCR-ABL fusion signal.  ?-Deletion of 9q34 (associated with a poor prognosis in some reports, although this is controversial)  -Gleevec 400 mg p.o. daily,?started on 4/21/10.  -Patient stopped Gleevec?due to miscommunication and was restarted on 11/15/11.  -He stopped?Gleevec in 9/14 due to inability to get from pharmacy  -04/30/2018: b2a2 undetectable; b3a2 undetectable; e1a2 undetectable?(complete molecular response)  -08/20/2018:? b2a2 level 0.0491 %; b3a2 undetectable; e1a2 undetectable  -02/20/2019: b2a2 level 2.2087%; b3a2 undetectable; e1a2 undetectable?(BCR-ABL 1?>1%; therefore, TKI resistant disease)  -05/10/2019: b2a2 level 0.9306%; b3a2 undetectable; e1a2 undetectable?(BCR-ABL 1?<1%; therefore, TKI sensitive disease)  -8/06/2019: b2a2 level 0.7503%; b3a2 undetectable; e1a2 undetectable  -11/11/2019: b2a2 level 2.8154%; b3a2 undetectable; e1a2 undetectable?(BCR-ABL 1?>1%; therefore, TKI resistant disease)  -12/23/2019: 1.0131%;?b3a2 undetectable; e1a2 undetectable?(BCR-ABL 1 >1%; therefore,?TKI resistant disease)  -03/10/2020: b2a2 0.5835%; b3a2 undetectable; e1a2  undetectable?(BCR-ABL 1?<1%;?therefore, once again,?in TKI sensitive?range)  -05/05/2020: Bone marrow aspiration and core biopsy: Mildly hypocellular bone marrow with trilineage hematopoiesis and maturation; bone marrow cellularity approximately 20%; positive for BCR-ABL 1 transcripts,?b2a2 (p210) (0.069%)  -->Lack of complete cytogenetic response (CCyR)  ?  2. Iron Deficiency Anemia.? Patient was?on aspirin and Plavix.  -03/07/2016: Colonoscopy (iron deficiency anemia): Diverticulosis; a 4 mm polyp at 18 cm as well as scattered diffuse diverticulosis (pathology: Hyperplastic polyp)  -Improvement in H&H?with oral iron therapy  ?   3. Multiple comorbidities.  Bilateral above knee amputee many years back secondary to peripheral vascular disease.  ?   4. ?Venous and arterial clots:  -09/26/2017: Arterial ultrasound left upper extremity: Completely occlusive thrombus in the left brachial artery at the level of the recently placed stent and extending to the mid and distal brachial artery.  -01/11/2019: Venous ultrasound right upper extremity: Likely occlusion of the right radial artery along its entirety; right ulnar artery appears occluded at the level of the wrist  -01/28/2019: Venous ultrasound of the right upper extremity (pain): Acute DVT in the right distal brachial vein  ?   5. ?History of CAD  -11/11/2019: ER: NSTEMI; multivessel CAD on cardiac cath, with all previous stents patent; medical management recommended  ?  ?  Plan:  -Bone marrow biopsy (05/05/2020)?with persistent BCR-ABL 1 positivity  (Lack of complete cytogenetic response)  -So far, on Gleevec  -Still awaiting the report of BCR-ABL 1 kinase domain mutation analysis; will reorder  -After we get the report of BCR-ABL 1 kinase domain mutation analysis,?then will switch to?different TKI  -Repeat quantitative BCR-ABL 1?by PCR testing?in 3 months (early June)  -Check TSH and free T4 end of July  -Continue Eliquis 5 mg twice daily (vascular  surgery/cardiology managing)  -Compliance with medications encouraged  -05/21/2021 2020:?Brown urine for last 2 weeks; will order UA and urine culture, and urine cytology  -If, on urine examination, hematuria is noted,?then he will need?abdominal/pelvic imaging and referral to urology  ?  Follow-up visit in 1 month, with requested labs.  ?  Above discussed at length with him. ?All questions answered. ?Went over labs and bone marrow biopsy report and gave him copies for his record. ?He understands and agrees with this plan.  ----------  ?  ?  As of 03/17/2020:  -BCR-ABL 1 kinase domain mutation is pending?(per NCCN guidelines, if no BCR-ABL 1 kinase domain mutation?identified,?then consider myeloid mutation panel?to identify BCR-ABL 1?independent resistance mutations)  ?  His bone marrow aspiration and core biopsy was delayed?because of his noncompliance with follow-up with cardiology?for recommendations regarding holding anticoagulation?and antiplatelet therapy?prior to bone marrow biopsy procedure?(he has history of stent placement and DVT).  Purpose of bone marrow aspiration and core biopsy:?For cytogenetic analysis?to evaluate for loss of CCyR)  ?  -BCR-ABL 1 level is once again <1%?(in TKI?sensitive disease range); thus,?his BCR-ABL 1 level keeps fluctuating  ---------  ?  -Extremely noncompliant with follow-ups. ?Cardiology wanted to see him for advice about holding anticoagulation and antiplatelet therapy prior to bone marrow aspiration and core biopsy but he failed to show up to the appointment on 02/18/2020. ?Cardiology want to see him because of history of stent placement and DVT.  ?  Laboratory to perform the correct test (BCR-ABL 1?kinase domain mutation)?(by mistake, they performed quantitative BCR-ABL 1 testing).  ?  Close to 10 years?after starting Gleevec, BCR-ABL 1 level was greater than 2.8%?on 11/11/2019, and 1.0131%?on 12/23/2019  Of note, he had complete molecular response on 04/30/2018 but  BCR-ABL 1 transcripts increased subsequently.  -->  This indicates TKI resistant disease.  -->  However, on 03/10/2020,?BCR-ABL 1 level is once again <1%, indicating?TKI sensitive disease  ?  In this situation, clinical considerations are:  -Evaluate patient compliance and drug interactions?(he is compliant)  -Consider mutational analysis?(ordered;?laboratory performed incorrect testing)  ?  In this situation, second line treatment options are:  -Switch to alternate TKI  -Evaluate for allogenic HCT  -->?However,?since, on 03/10/2020,?BCR-ABL 1?level is once again <1%, therefore,?will hold off on plans to switch to alternate TKI?and await bone marrow aspiration and poor biopsy.  ?  Continue?same treatment (Gleevec) until?requested evaluation is complete.  ?  On Eliquis 5 mg twice daily for right radial artery occlusion in its entirety (01/11/2019) and acute DVT in the right distal brachial vein (01/28/2019) (also has history of complete occlusive thrombus in the left brachial artery 09/26/2017). ?This is being managed by?vascular surgery.  ?  ?  ?  Discussion:  Warnings and precautions with imatinib:  Edema and severe fluid retention.  Cytopenias, particularly anemia, neutropenia, and thrombus cytopenia.? Perform blood counts weekly for the first month, biweekly for the second month, and periodically thereafter.  Severe congestive heart failure and LV dysfunction.  Severe hepatotoxicity.? Check liver function monthly.  Grade 3/4 hemorrhage.? GI perforations.? Cardiogenic shock/LV dysfunction with initiation of imatinib in patients with conditions associated with high eosinophilic counts (hypereosinophilic syndrome, MDS/MPD, aggressive systemic mastocytosis).  Bullous dermatologic reactions (erythema multiforme, Fletcher-Tony syndrome).  Hypothyroidism.  Tumor lysis syndrome.  Motor vehicle accident.?  Renal toxicity.  ?  The most frequently reported adverse reactions (greater than or equal to 30%): Edema, nausea,  vomiting, muscle cramps, musculoskeletal pain, diarrhea, rash, fatigue, abdominal pain.  ?  Gleevec monitoring:  -CBC weekly for first month, biweekly for second month, then periodically thereafter  -LFTs at baseline and monthly or as clinically indicated; more frequently, at least weekly, in patients with moderate to severe hepatic impairment  -Renal function at baseline and periodically thereafter; serum calcium, phosphorus, potassium, and sodium levels  -Thyroid function testing: TSH at baseline, then every 4 weeks for 4 months, then every 2 to 3 months  -Monitor for signs/symptoms of CHF in patients at risk for cardiac failure or patients with pre-existing cardiac disease  -Monitor for signs/symptoms of GI irritation or perforation and dermatologic toxicities  Physical Exam  Vitals & Measurements  T:?37? ?C (Oral)? HR:?59(Peripheral)? RR:?18? BP:?132/56? SpO2:?98%?  HT:?162?cm? WT:?51.7?kg? BMI:?19.7?   Problem List/Past Medical History  Ongoing  CML - Chronic myeloid leukemia  Iron deficiency anemia  Historical  CAD (coronary atherosclerotic disease)  CVA (cerebral vascular accident)  Hyperlipidemia  Leukemia  Morbid obesity  PVD (peripheral vascular disease)  seizures  Procedure/Surgical History  Bone Marrow Biopsy (None) (05/05/2020)  Diagnostic bone marrow; biopsy(ies) and aspiration(s) (05/05/2020)  Drainage of Bone Marrow, Percutaneous Approach, Diagnostic (05/05/2020)  Extraction of Iliac Bone Marrow, Percutaneous Approach, Diagnostic (05/05/2020)  Fluoroscopy of Left Heart using Low Osmolar Contrast (11/11/2019)  Fluoroscopy of Multiple Coronary Arteries using Low Osmolar Contrast (11/11/2019)  Measurement of Cardiac Sampling and Pressure, Left Heart, Percutaneous Approach (11/11/2019)  Fluoroscopy of Left Lower Extremity Arteries using Other Contrast (05/09/2017)  Fluoroscopy of Multiple Coronary Artery Bypass Grafts using Other Contrast (05/09/2017)  Fluoroscopy of Right and Left Heart using Other  Contrast (05/09/2017)  Fluoroscopy of Right Upper Extremity Arteries using Other Contrast (05/09/2017)  Measurement of Cardiac Sampling and Pressure, Bilateral, Percutaneous Approach (05/09/2017)  Catheter placement in coronary artery(s) for coronary angiography, including intraprocedural injection(s) for coronary angiography, imaging supervision and interpretation; with left heart catheterization including intraprocedural injection(s) for left felipe (09/26/2016)  Fluoroscopy of Left Heart using Low Osmolar Contrast (09/26/2016)  Fluoroscopy of Left Internal Mammary Bypass Graft using Low Osmolar Contrast (09/26/2016)  Fluoroscopy of Left Upper Extremity Arteries using Low Osmolar Contrast (09/26/2016)  Fluoroscopy of Multiple Coronary Arteries using Low Osmolar Contrast (09/26/2016)  Fluoroscopy of Multiple Coronary Artery Bypass Grafts using Low Osmolar Contrast (09/26/2016)  Measurement of Cardiac Sampling and Pressure, Left Heart, Percutaneous Approach (09/26/2016)  Selective catheter placement, arterial system; initial third order or more selective thoracic or brachiocephalic branch, within a vascular family (09/26/2016)  Colonoscopy, flexible; with biopsy, single or multiple (03/07/2016)  Excision of Large Intestine, Via Natural or Artificial Opening Endoscopic, Diagnostic (03/07/2016)  HOSPITAL OBSERVATION SERVICE, PER HOUR (12/23/2015)  open heart surgery (2012)  right AKA (2007)  right thumb amputation (2007)  left AKA (2006)  Angiogram   Medications  aspirin 81 mg oral tablet, CHEWABLE, 81 mg= 1 tab(s), Oral, Daily  atorvastatin 40 mg oral tablet, 40 mg= 1 tab(s), Oral, At Bedtime, 3 refills  clopidogrel 75 mg oral tablet, 75 mg= 1 tab(s), Oral, Daily, 3 refills,? ?Not taking  Coreg 6.25 mg oral tablet, 6.25 mg= 1 tab(s), Oral, BID, 3 refills  Eliquis 5 mg oral tablet, 5 mg= 1 tab(s), Oral, BID, 3 refills  Flonase 50 mcg/inh nasal spray, 2 spray(s), Nasal, Daily  Gleevec 400 mg oral tablet, 1, Oral, Daily, 5  refills  Imdur 30 mg oral tablet, extended release, 30 mg= 1 tab(s), Oral, Daily, 3 refills  ketorolac for IM, 15 mg= 0.5 mL, IM, Once-NOW  Lidocaine Viscous 2% mucous membrane solution, 15 mL, N/A, Once  lisinopril 5 mg oral tablet, 2.5 mg= 0.5 tab(s), Oral, Daily, 3 refills  MEGESTROL AC LEEANNE 40MG/ML  Nexium 20 mg oral delayed release capsule, 20 mg= 1 cap(s), Oral, Daily, 6 refills  nitroglycerin 0.4 mg sublingual TAB, 0.4 mg= 1 tab(s), SL, q5min, PRN, 6 refills  PRALUENT INJ 75MG/ML, 75 mg, IM, q2wk  Zanaflex 4 mg oral tablet, 8 mg= 2 tab(s), Oral, q8hr  Allergies  penicillins?(itching)  seafood?(itching)  Social History  Abuse/Neglect  No, No, Yes, 2020  Alcohol  Past, Beer, Wine, Daily, Started age 19 Years. Stopped age 36 Years. Previous treatment: Alcoholics Anonymous., 2014  Employment/School  Unemployed, Work/School description: Disabled., 2019  Exercise - Does not exercise, 2014  Home/Environment  Lives with Spouse, grandson. Living situation: Home/Independent., 2019  Nutrition/Health  Regular, Good, 2019  Sexual - No Sexual Activity, 2014  Substance Use - Denies Substance Abuse, 2014  Tobacco  Former smoker, quit more than 30 days ago, N/A, 2020  Family History  : Mother and Father.  Diabetes: Sister and Brother.  Heart attack: Mother and Father.  Renal failure syndrome.: Brother.  Immunizations  Vaccine Date Status   influenza virus vaccine, inactivated 2018 Given   influenza virus vaccine, inactivated 2017 Given   influenza virus vaccine, inactivated 2017 Given